# Patient Record
Sex: MALE | Race: BLACK OR AFRICAN AMERICAN | NOT HISPANIC OR LATINO | URBAN - METROPOLITAN AREA
[De-identification: names, ages, dates, MRNs, and addresses within clinical notes are randomized per-mention and may not be internally consistent; named-entity substitution may affect disease eponyms.]

---

## 2019-09-07 ENCOUNTER — EMERGENCY (EMERGENCY)
Facility: HOSPITAL | Age: 49
LOS: 1 days | Discharge: HOME | End: 2019-09-07
Attending: EMERGENCY MEDICINE | Admitting: EMERGENCY MEDICINE
Payer: COMMERCIAL

## 2019-09-07 VITALS
OXYGEN SATURATION: 100 % | SYSTOLIC BLOOD PRESSURE: 110 MMHG | HEART RATE: 86 BPM | DIASTOLIC BLOOD PRESSURE: 81 MMHG | RESPIRATION RATE: 100 BRPM | TEMPERATURE: 98 F

## 2019-09-07 DIAGNOSIS — Z98.89 OTHER SPECIFIED POSTPROCEDURAL STATES: Chronic | ICD-10-CM

## 2019-09-07 LAB
ALBUMIN SERPL ELPH-MCNC: 4.1 G/DL — SIGNIFICANT CHANGE UP (ref 3.3–5)
ALP SERPL-CCNC: 71 U/L — SIGNIFICANT CHANGE UP (ref 40–120)
ALT FLD-CCNC: 31 U/L — SIGNIFICANT CHANGE UP (ref 4–41)
ANION GAP SERPL CALC-SCNC: 9 MMO/L — SIGNIFICANT CHANGE UP (ref 7–14)
AST SERPL-CCNC: 24 U/L — SIGNIFICANT CHANGE UP (ref 4–40)
BASOPHILS # BLD AUTO: 0.04 K/UL — SIGNIFICANT CHANGE UP (ref 0–0.2)
BASOPHILS NFR BLD AUTO: 0.6 % — SIGNIFICANT CHANGE UP (ref 0–2)
BILIRUB SERPL-MCNC: < 0.2 MG/DL — LOW (ref 0.2–1.2)
BUN SERPL-MCNC: 16 MG/DL — SIGNIFICANT CHANGE UP (ref 7–23)
CALCIUM SERPL-MCNC: 9.2 MG/DL — SIGNIFICANT CHANGE UP (ref 8.4–10.5)
CHLORIDE SERPL-SCNC: 102 MMOL/L — SIGNIFICANT CHANGE UP (ref 98–107)
CO2 SERPL-SCNC: 29 MMOL/L — SIGNIFICANT CHANGE UP (ref 22–31)
CREAT SERPL-MCNC: 0.88 MG/DL — SIGNIFICANT CHANGE UP (ref 0.5–1.3)
EOSINOPHIL # BLD AUTO: 0.3 K/UL — SIGNIFICANT CHANGE UP (ref 0–0.5)
EOSINOPHIL NFR BLD AUTO: 4.5 % — SIGNIFICANT CHANGE UP (ref 0–6)
GLUCOSE SERPL-MCNC: 126 MG/DL — HIGH (ref 70–99)
HCT VFR BLD CALC: 43.7 % — SIGNIFICANT CHANGE UP (ref 39–50)
HGB BLD-MCNC: 13.7 G/DL — SIGNIFICANT CHANGE UP (ref 13–17)
IMM GRANULOCYTES NFR BLD AUTO: 0.3 % — SIGNIFICANT CHANGE UP (ref 0–1.5)
LYMPHOCYTES # BLD AUTO: 2.22 K/UL — SIGNIFICANT CHANGE UP (ref 1–3.3)
LYMPHOCYTES # BLD AUTO: 33.5 % — SIGNIFICANT CHANGE UP (ref 13–44)
MCHC RBC-ENTMCNC: 25.5 PG — LOW (ref 27–34)
MCHC RBC-ENTMCNC: 31.4 % — LOW (ref 32–36)
MCV RBC AUTO: 81.2 FL — SIGNIFICANT CHANGE UP (ref 80–100)
MONOCYTES # BLD AUTO: 0.55 K/UL — SIGNIFICANT CHANGE UP (ref 0–0.9)
MONOCYTES NFR BLD AUTO: 8.3 % — SIGNIFICANT CHANGE UP (ref 2–14)
NEUTROPHILS # BLD AUTO: 3.49 K/UL — SIGNIFICANT CHANGE UP (ref 1.8–7.4)
NEUTROPHILS NFR BLD AUTO: 52.8 % — SIGNIFICANT CHANGE UP (ref 43–77)
NRBC # FLD: 0 K/UL — SIGNIFICANT CHANGE UP (ref 0–0)
OB PNL STL: POSITIVE — SIGNIFICANT CHANGE UP
PLATELET # BLD AUTO: 274 K/UL — SIGNIFICANT CHANGE UP (ref 150–400)
PMV BLD: 9.5 FL — SIGNIFICANT CHANGE UP (ref 7–13)
POTASSIUM SERPL-MCNC: 3.8 MMOL/L — SIGNIFICANT CHANGE UP (ref 3.5–5.3)
POTASSIUM SERPL-SCNC: 3.8 MMOL/L — SIGNIFICANT CHANGE UP (ref 3.5–5.3)
PROT SERPL-MCNC: 7.2 G/DL — SIGNIFICANT CHANGE UP (ref 6–8.3)
RBC # BLD: 5.38 M/UL — SIGNIFICANT CHANGE UP (ref 4.2–5.8)
RBC # FLD: 12.5 % — SIGNIFICANT CHANGE UP (ref 10.3–14.5)
SODIUM SERPL-SCNC: 140 MMOL/L — SIGNIFICANT CHANGE UP (ref 135–145)
WBC # BLD: 6.62 K/UL — SIGNIFICANT CHANGE UP (ref 3.8–10.5)
WBC # FLD AUTO: 6.62 K/UL — SIGNIFICANT CHANGE UP (ref 3.8–10.5)

## 2019-09-07 PROCEDURE — 99283 EMERGENCY DEPT VISIT LOW MDM: CPT

## 2019-09-07 RX ORDER — ACETAMINOPHEN 500 MG
975 TABLET ORAL ONCE
Refills: 0 | Status: COMPLETED | OUTPATIENT
Start: 2019-09-07 | End: 2019-09-07

## 2019-09-07 RX ADMIN — Medication 975 MILLIGRAM(S): at 18:44

## 2019-09-07 NOTE — ED PROVIDER NOTE - CLINICAL SUMMARY MEDICAL DECISION MAKING FREE TEXT BOX
50 yo M, w/ PMH of chronic rectal pain and bleeding with negative colonoscopies in the past, presenting w/ acute exacerbation of his chronic rectal pain, which began last night, and has gradually worsened. Rectal exam causes pain, but otherwise unremarkable rectal physical exam. Remainder of physical exam unremarkable. Will obtain fecal occult blood feces and reassess. 50 yo M, w/ PMH of chronic rectal pain and bleeding with negative colonoscopies in the past, presenting w/ acute exacerbation of his chronic rectal pain, which began last night, and has gradually worsened. Rectal exam causes pain, but otherwise unremarkable rectal physical exam. Remainder of physical exam unremarkable. Will obtain fecal occult blood feces, labs, treat pain, and reassess.

## 2019-09-07 NOTE — ED PROVIDER NOTE - CONSTITUTIONAL, MLM
normal... Well appearing, well nourished, awake, alert, oriented to person, place, time/situation and in no apparent distress. Lying on side d/t rectal pain.

## 2019-09-07 NOTE — ED ADULT NURSE NOTE - NSIMPLEMENTINTERV_GEN_ALL_ED
Implemented All Universal Safety Interventions:  [place] to call system. Call bell, personal items and telephone within reach. Instruct patient to call for assistance. Room bathroom lighting operational. Non-slip footwear when patient is off stretcher. Physically safe environment: no spills, clutter or unnecessary equipment. Stretcher in lowest position, wheels locked, appropriate side rails in place.

## 2019-09-07 NOTE — ED PROVIDER NOTE - NSFOLLOWUPINSTRUCTIONS_ED_ALL_ED_FT
Follow up with your primary care physician in 48-72 hours- bring copies of your results.   Follow up with Gastroenterology- referral list provided.  Return to the Emergency Department for worsening or persistent symptoms OR ANY NEW OR CONCERNING SYMPTOMS.

## 2019-09-07 NOTE — ED ADULT TRIAGE NOTE - CHIEF COMPLAINT QUOTE
reports bleeding only when wiping, not actively bleeding Color consistent with ethnicity/race, warm, dry intact, resilient.

## 2019-09-07 NOTE — ED ADULT NURSE NOTE - OBJECTIVE STATEMENT
50 yo male, c/o rectal pain since childhood. pt has followed up with outside GI with no specific diagnosis. pt recently had colonoscopy, which results are not available yet. pt reports coming to ED for increased pain over last 2 days. pt denies active bleeding, and reports only bleeding while wiping. pt denies fever, n/v/d. pt reports intermittent heart burn. 20g iv insert to right ac. pt medicated as ordered.

## 2019-09-07 NOTE — ED PROVIDER NOTE - PATIENT PORTAL LINK FT
You can access the FollowMyHealth Patient Portal offered by [facility] by registering at the following website: [email or web address]. By joining [employer]’s FollowMyHealth portal, you will also be able to view your health information using other applications (apps) compatible with our system.

## 2019-09-07 NOTE — ED PROVIDER NOTE - PROGRESS NOTE DETAILS
STEPHANE Maldonado: Pt signed out to me by Dr. Morfin to follow up lab results and likely dc home w/ GI Followup. labs wnl. h/h stable, no elevated wbc. will dc home with outpatient GI and PMD follow up. return precautions given. ready for dc.

## 2019-09-07 NOTE — ED PROVIDER NOTE - PHYSICAL EXAMINATION
Rectal Exam (chaperoned by technician Skyla): No hemorrhoids, abscesses, pilonidal cysts, or anal fissures appreciated. No redness, swelling or induration in the anal area. +pain with rectal exam. No masses appreciated. Mucus-appearing stool on glove. Sent to lab for fecal occult blood. Rectal Exam (chaperoned by technician Skyla): No hemorrhoids, abscesses, pilonidal cysts, or anal fissures appreciated. No redness, swelling or induration in the anal area. +pain with rectal exam. No masses appreciated. Mucus-appearing stool on glove. Sent to lab for fecal occult blood.  ATTENDING PHYSICAL EXAM DR. Reid ***GEN - NAD; well appearing; A+O x3 ***HEAD - NC/AT   ***PULMONARY - CTA b/l, symmetric breath sounds. ***CARDIAC -s1s2, RRR, no M,R,G  ***ABDOMEN - +BS, ND, NT, soft, no guarding, no rebound, no masses   ***BACK - no CVA tenderness, Normal  spine ***EXTREMITIES - symmetric pulses, 2+ dp, capillary refill < 2 seconds, no clubbing, no cyanosis, no edema ***SKIN - no rash or bruising   Rectal exam as performed by Resident.

## 2019-09-07 NOTE — ED PROVIDER NOTE - OBJECTIVE STATEMENT
48 yo M, accompanied by family, w/ PMH of acute on chronic rectal pain/bleeding. Patient states that he has had rectal pain for a long time, with occasional bleeding as well. Has had multiple colonoscopies in the past, which have never been significant. He has no history of rectal abscesses or pilonidal cysts. He also has constipation, and has recently started taking stool softeners to help with this. States that his chronic rectal pain got a lot worse last night to the point where he could not sit on his butt d/t pain, which is why he presented to [facility] ED today. Denies abdominal pain, fevers, or any other complaints. Denies headache, neck stiffness, fever/chills, vision change, chest pain, shortness of breath, difficulty breathing, palpitations, lightheadedness, weakness, dizziness, numbness, tingling, abdominal pain, nausea, vomiting, diarrhea, dysuria, hematuria, syncope.     PMD: Dr. Joe Kolb  Pharm: [place], [place] 48 yo M, accompanied by family, w/ PMH of acute on chronic rectal pain/bleeding. Patient states that he has had rectal pain for a long time, with occasional bleeding as well. Has had multiple colonoscopies in the past, which have never been significant. He has no history of rectal abscesses or pilonidal cysts. He also has constipation, and has recently started taking stool softeners to help with this. States that his chronic rectal pain got a lot worse last night to the point where he could not sit on his butt d/t pain, which is why he presented to [facility] ED today. Denies abdominal pain, fevers, or any other complaints. Denies headache, neck stiffness, fever/chills, vision change, chest pain, shortness of breath, difficulty breathing, palpitations, lightheadedness, weakness, dizziness, numbness, tingling, abdominal pain, nausea, vomiting, diarrhea, dysuria, hematuria, syncope.     PMD: Dr. Joe Kolb  Pharm: [place], [place]  Attending - Reviewed above.  I evaluated patient myself.  48 y/o M with family at bedside.  Reports episodes of rectal pain > 1 year.  Reports cared for by a GI doctor in [place], unknown name.  Had colonoscopy done for evaluation approx 1 year ago.  Has returned to doctor but has not gotten results per patient.  Believes it may have shown "bruise" in rectum.  Has been advised to use creams and stool softeners.  To ED as frustrated that GI doctor has not made diagnosis and requesting new GI referral.  No pain currently.  States he often notices blood on toilet paper with wiping.  Denies abd pain/n/v.  No diarrhea.

## 2022-05-28 ENCOUNTER — EMERGENCY (EMERGENCY)
Facility: HOSPITAL | Age: 52
LOS: 1 days | Discharge: HOME | End: 2022-05-28
Admitting: EMERGENCY MEDICINE
Payer: MEDICAID

## 2022-05-28 VITALS
SYSTOLIC BLOOD PRESSURE: 128 MMHG | TEMPERATURE: 98 F | DIASTOLIC BLOOD PRESSURE: 86 MMHG | HEART RATE: 93 BPM | RESPIRATION RATE: 17 BRPM | OXYGEN SATURATION: 95 %

## 2022-05-28 DIAGNOSIS — Z98.89 OTHER SPECIFIED POSTPROCEDURAL STATES: Chronic | ICD-10-CM

## 2022-05-28 PROCEDURE — 73110 X-RAY EXAM OF WRIST: CPT | Mod: 26,RT

## 2022-05-28 PROCEDURE — 99284 EMERGENCY DEPT VISIT MOD MDM: CPT

## 2022-05-28 PROCEDURE — 73090 X-RAY EXAM OF FOREARM: CPT | Mod: 26,RT
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
## 2022-05-28 NOTE — ED PROVIDER NOTE - PATIENT PORTAL LINK FT
You can access the FollowMyHealth Patient Portal offered by [facility] by registering at the following website: [email or web address]. By joining [employer]’s FollowMyHealth portal, you will also be able to view your health information using other applications (apps) compatible with our system.
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
## 2022-05-28 NOTE — ED PROVIDER NOTE - MUSCULOSKELETAL, MLM
right forearm: + bruising and swelling to the lateral aspect of distal 3rd of forearm. FROM of wrist,elbow and shoulder joint. no sensory deficits, compartment is soft. pulse present.

## 2022-12-15 ENCOUNTER — INPATIENT (INPATIENT)
Facility: HOSPITAL | Age: 52
LOS: 1 days | Discharge: HOME | End: 2022-12-17
Attending: INTERNAL MEDICINE | Admitting: INTERNAL MEDICINE

## 2022-12-15 VITALS
OXYGEN SATURATION: 100 % | DIASTOLIC BLOOD PRESSURE: 87 MMHG | TEMPERATURE: 98 F | RESPIRATION RATE: 14 BRPM | HEART RATE: 101 BPM | SYSTOLIC BLOOD PRESSURE: 126 MMHG

## 2022-12-15 DIAGNOSIS — R07.9 CHEST PAIN, UNSPECIFIED: ICD-10-CM

## 2022-12-15 DIAGNOSIS — S09.90XA UNSPECIFIED INJURY OF HEAD, INITIAL ENCOUNTER: ICD-10-CM

## 2022-12-15 DIAGNOSIS — Z29.9 ENCOUNTER FOR PROPHYLACTIC MEASURES, UNSPECIFIED: ICD-10-CM

## 2022-12-15 DIAGNOSIS — R06.02 SHORTNESS OF BREATH: ICD-10-CM

## 2022-12-15 DIAGNOSIS — J10.1 INFLUENZA DUE TO OTHER IDENTIFIED INFLUENZA VIRUS WITH OTHER RESPIRATORY MANIFESTATIONS: ICD-10-CM

## 2022-12-15 DIAGNOSIS — Z98.89 OTHER SPECIFIED POSTPROCEDURAL STATES: Chronic | ICD-10-CM

## 2022-12-15 LAB
ALBUMIN SERPL ELPH-MCNC: 4.6 G/DL — SIGNIFICANT CHANGE UP (ref 3.3–5)
ALP SERPL-CCNC: 81 U/L — SIGNIFICANT CHANGE UP (ref 40–120)
ALT FLD-CCNC: 25 U/L — SIGNIFICANT CHANGE UP (ref 4–41)
ANION GAP SERPL CALC-SCNC: 11 MMOL/L — SIGNIFICANT CHANGE UP (ref 7–14)
AST SERPL-CCNC: 23 U/L — SIGNIFICANT CHANGE UP (ref 4–40)
BASOPHILS # BLD AUTO: 0.03 K/UL — SIGNIFICANT CHANGE UP (ref 0–0.2)
BASOPHILS NFR BLD AUTO: 0.4 % — SIGNIFICANT CHANGE UP (ref 0–2)
BILIRUB SERPL-MCNC: 0.2 MG/DL — SIGNIFICANT CHANGE UP (ref 0.2–1.2)
BUN SERPL-MCNC: 19 MG/DL — SIGNIFICANT CHANGE UP (ref 7–23)
CALCIUM SERPL-MCNC: 9.1 MG/DL — SIGNIFICANT CHANGE UP (ref 8.4–10.5)
CHLORIDE SERPL-SCNC: 100 MMOL/L — SIGNIFICANT CHANGE UP (ref 98–107)
CO2 SERPL-SCNC: 27 MMOL/L — SIGNIFICANT CHANGE UP (ref 22–31)
CREAT SERPL-MCNC: 0.94 MG/DL — SIGNIFICANT CHANGE UP (ref 0.5–1.3)
EGFR: 96 ML/MIN/1.73M2 — SIGNIFICANT CHANGE UP
EOSINOPHIL # BLD AUTO: 0.13 K/UL — SIGNIFICANT CHANGE UP (ref 0–0.5)
EOSINOPHIL NFR BLD AUTO: 1.6 % — SIGNIFICANT CHANGE UP (ref 0–6)
FLUAV AG NPH QL: DETECTED
FLUBV AG NPH QL: SIGNIFICANT CHANGE UP
GLUCOSE SERPL-MCNC: 99 MG/DL — SIGNIFICANT CHANGE UP (ref 70–99)
HCT VFR BLD CALC: 41.9 % — SIGNIFICANT CHANGE UP (ref 39–50)
HGB BLD-MCNC: 13.2 G/DL — SIGNIFICANT CHANGE UP (ref 13–17)
IANC: 6.28 K/UL — SIGNIFICANT CHANGE UP (ref 1.8–7.4)
IMM GRANULOCYTES NFR BLD AUTO: 0.4 % — SIGNIFICANT CHANGE UP (ref 0–0.9)
LYMPHOCYTES # BLD AUTO: 1.06 K/UL — SIGNIFICANT CHANGE UP (ref 1–3.3)
LYMPHOCYTES # BLD AUTO: 12.9 % — LOW (ref 13–44)
MCHC RBC-ENTMCNC: 25 PG — LOW (ref 27–34)
MCHC RBC-ENTMCNC: 31.5 GM/DL — LOW (ref 32–36)
MCV RBC AUTO: 79.4 FL — LOW (ref 80–100)
MONOCYTES # BLD AUTO: 0.66 K/UL — SIGNIFICANT CHANGE UP (ref 0–0.9)
MONOCYTES NFR BLD AUTO: 8.1 % — SIGNIFICANT CHANGE UP (ref 2–14)
NEUTROPHILS # BLD AUTO: 6.28 K/UL — SIGNIFICANT CHANGE UP (ref 1.8–7.4)
NEUTROPHILS NFR BLD AUTO: 76.6 % — SIGNIFICANT CHANGE UP (ref 43–77)
NRBC # BLD: 0 /100 WBCS — SIGNIFICANT CHANGE UP (ref 0–0)
NRBC # FLD: 0 K/UL — SIGNIFICANT CHANGE UP (ref 0–0)
NT-PROBNP SERPL-SCNC: <5 PG/ML — SIGNIFICANT CHANGE UP
PLATELET # BLD AUTO: 291 K/UL — SIGNIFICANT CHANGE UP (ref 150–400)
POTASSIUM SERPL-MCNC: 4.4 MMOL/L — SIGNIFICANT CHANGE UP (ref 3.5–5.3)
POTASSIUM SERPL-SCNC: 4.4 MMOL/L — SIGNIFICANT CHANGE UP (ref 3.5–5.3)
PROT SERPL-MCNC: 7.4 G/DL — SIGNIFICANT CHANGE UP (ref 6–8.3)
RBC # BLD: 5.28 M/UL — SIGNIFICANT CHANGE UP (ref 4.2–5.8)
RBC # FLD: 12.7 % — SIGNIFICANT CHANGE UP (ref 10.3–14.5)
RSV RNA NPH QL NAA+NON-PROBE: SIGNIFICANT CHANGE UP
SARS-COV-2 RNA SPEC QL NAA+PROBE: SIGNIFICANT CHANGE UP
SODIUM SERPL-SCNC: 138 MMOL/L — SIGNIFICANT CHANGE UP (ref 135–145)
TROPONIN T, HIGH SENSITIVITY RESULT: 6 NG/L — SIGNIFICANT CHANGE UP
WBC # BLD: 8.19 K/UL — SIGNIFICANT CHANGE UP (ref 3.8–10.5)
WBC # FLD AUTO: 8.19 K/UL — SIGNIFICANT CHANGE UP (ref 3.8–10.5)

## 2022-12-15 PROCEDURE — 99223 1ST HOSP IP/OBS HIGH 75: CPT

## 2022-12-15 PROCEDURE — 99285 EMERGENCY DEPT VISIT HI MDM: CPT

## 2022-12-15 PROCEDURE — 71046 X-RAY EXAM CHEST 2 VIEWS: CPT | Mod: 26

## 2022-12-15 PROCEDURE — 93010 ELECTROCARDIOGRAM REPORT: CPT

## 2022-12-15 PROCEDURE — 70450 CT HEAD/BRAIN W/O DYE: CPT | Mod: 26,MA

## 2022-12-15 RX ORDER — SODIUM CHLORIDE 9 MG/ML
1000 INJECTION INTRAMUSCULAR; INTRAVENOUS; SUBCUTANEOUS ONCE
Refills: 0 | Status: COMPLETED | OUTPATIENT
Start: 2022-12-15 | End: 2022-12-15

## 2022-12-15 RX ORDER — ASPIRIN/CALCIUM CARB/MAGNESIUM 324 MG
81 TABLET ORAL DAILY
Refills: 0 | Status: DISCONTINUED | OUTPATIENT
Start: 2022-12-15 | End: 2022-12-17

## 2022-12-15 RX ORDER — LANOLIN ALCOHOL/MO/W.PET/CERES
3 CREAM (GRAM) TOPICAL AT BEDTIME
Refills: 0 | Status: DISCONTINUED | OUTPATIENT
Start: 2022-12-15 | End: 2022-12-17

## 2022-12-15 RX ORDER — ACETAMINOPHEN 500 MG
650 TABLET ORAL EVERY 6 HOURS
Refills: 0 | Status: DISCONTINUED | OUTPATIENT
Start: 2022-12-15 | End: 2022-12-17

## 2022-12-15 RX ORDER — ENOXAPARIN SODIUM 100 MG/ML
40 INJECTION SUBCUTANEOUS EVERY 24 HOURS
Refills: 0 | Status: DISCONTINUED | OUTPATIENT
Start: 2022-12-15 | End: 2022-12-17

## 2022-12-15 RX ADMIN — Medication 650 MILLIGRAM(S): at 23:17

## 2022-12-15 RX ADMIN — SODIUM CHLORIDE 1000 MILLILITER(S): 9 INJECTION INTRAMUSCULAR; INTRAVENOUS; SUBCUTANEOUS at 20:46
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
## 2022-12-15 NOTE — H&P ADULT - NSHPREVIEWOFSYSTEMS_GEN_ALL_CORE
ROS:    Constitutional: [ ] fevers [ ] chills  HEENT: [ ] postnasal drip [ ] nasal congestion  CV: [x ] chest pain [ ] orthopnea [ ] palpitations   Resp: [ ] cough [x ] shortness of breath [ ] dyspnea [ ] wheezing   GI: [ ] nausea [ ] vomiting [ ] diarrhea [ ] constipation [ ] abd pain    : [ ] dysuria  [ ] increased urinary frequency  Musculoskeletal: [ ] back pain [ ] myalgias [ ] arthralgias   Skin: [ ] rash [ ] itch  Neurological: [ ] headache [ ] dizziness [ ] syncope   Endocrine: [ ] diabetes [ ] thyroid problem  Hematologic/Lymphatic: [ ] anemia [ ] bleeding problem  [x ] All other systems negative

## 2022-12-15 NOTE — H&P ADULT - PROBLEM SELECTOR PLAN 2
Chronic SOB for years. Has at night at times. No hypoxia, no tachypnea  -sleep study outpatient  -pulm f/u  -echo, stress

## 2022-12-15 NOTE — H&P ADULT - PROBLEM SELECTOR PLAN 4
Lovenox 40mg qd  DASH/TLC diet Has HA but no other URI sx  -tylenol PRN, cough suppressants if needed

## 2022-12-15 NOTE — ED ADULT NURSE NOTE - OBJECTIVE STATEMENT
Pt received c/o chets discomfort, sinus congestion and cough. Pt endorses fevers at home. Afebrile at present. Denies SOB, respirations even and unlabored. 20g PIV placed in R AC, labs drawn as ordered. Safety maintained. Awaiting further orders.

## 2022-12-15 NOTE — ED PROVIDER NOTE - PHYSICAL EXAMINATION
Lisa BROWN:  VITALS: Initial triage and subsequent vitals have been reviewed by me.  GEN APPEARANCE: WDWN, alert and cooperative, non-toxic appearing and in NAD  HEAD: Atraumatic, normocephalic   EYES: PERRLa, EOMI, vision grossly intact.   EARS: Gross hearing intact.   NOSE: No nasal discharge, no external evidence of epistaxis.   NECK: Supple  CV: RRR, S1S2, no c/r/m/g. No cyanosis. Extremities warm, well perfused. Cap refill <2 seconds. No bruits.   LUNGS: CTAB. No wheezing. No rales. No rhonchi. No diminished breath sounds.   ABDOMEN: Soft, NTND. No guarding or rebound. No masses.   MSK/EXT: Spine appears normal, no spine point tenderness. No CVA ttp. Normal muscular development. Pelvis stable. No obvious joint or bony deformity, no peripheral edema.   NEURO: Alert, follows commands. Weight bearing normal. Speech normal. Sensation and motor normal x4 extremities.   SKIN: Normal color for race, warm, dry and intact. No evidence of rash.  PSYCH: Normal mood and affect.

## 2022-12-15 NOTE — H&P ADULT - NSHPLABSRESULTS_GEN_ALL_CORE
I have personally reviewed this patient's labs below:                        13.2   8.19  )-----------( 291      ( 15 Dec 2022 20:34 )             41.9     12-15-22 @ 20:34    138  |  100  |  19             --------------------------< 99     4.4  |  27  | 0.94    eGFR AA: --  eGFR N-AA: --    Calcium: 9.1  Phosphorus: --  Magnesium: --    AST: 23    ALT: 25  AlkPhos: 81  Protein: 7.4  Albumin: 4.6  TBili: 0.2  D-Bili: --        I have personally reviewed this patient's EKG and my independent interpretation is NSR, left axis deviation, Q wave V2    I have personally reviewed this patient's CXR and my independent interpretation is clear lungs      CTH performed and read pending

## 2022-12-15 NOTE — H&P ADULT - PROBLEM SELECTOR PLAN 1
Chest pain x1 week. Believes he had prior MI? but no records of. Denies prior angiogram.   -tele  -trop neg  -EKG nonischemic  -repeat trop, CK  -D-dimer and if elevated can get CTA. Though wells score 0, low risk overall  -echo ordered  -ischemic eval per primary cardiology team in AM. Likely will need at least stress test  -lipids, TSH
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
## 2022-12-15 NOTE — H&P ADULT - HISTORY OF PRESENT ILLNESS
52M with PMHx CAD? presenting with CP x1 week. Pt endorses having an abnormal EKG several years ago and being sent to ED by his PMD. He believes he was told he had an MI but is unsure. He does not recall ever having an angiogram or stents placed but remembers a stress test that was normal several years ago. He endorses chronic CP that comes and goes. However this week it has been more frequent and sometimes associated with exertion and relieved with rest. Also has some SOB on and off for years. This is worse at night and is yet to get sleep study. Denies LE edema, calf pain, hx stroke, arrhythmia, CHF. His only medication is ASA. Denies fever, cough, abdominal pain, vomiting, diarrhea.    In ED flu positive
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
## 2022-12-15 NOTE — H&P ADULT - NSHPPHYSICALEXAM_GEN_ALL_CORE
PHYSICAL EXAM:  Vital Signs Last 24 Hrs  T(C): 37.6 (12-15-22 @ 22:30)  T(F): 99.7 (12-15-22 @ 22:30), Max: 99.7 (12-15-22 @ 22:30)  HR: 90 (12-15-22 @ 22:30) (90 - 101)  BP: 142/94 (12-15-22 @ 22:30)  BP(mean): 114 (12-15-22 @ 22:30) (114 - 114)  RR: 17 (12-15-22 @ 22:30) (14 - 17)  SpO2: 100% (12-15-22 @ 22:30) (100% - 100%)  Wt(kg): --    Constitutional: NAD, awake and alert, well developed  EYES: EOMI, conjunctiva clear  ENT:  Normal Hearing, no tonsillar exudates   Neck: Soft and supple , no thyromegaly   Respiratory: Breath sounds are clear bilaterally, No wheezing, rales or rhonchi, no tachypnea, no accessory muscle use  Cardiovascular: S1 and S2, regular rate and rhythm, no Murmurs, gallops or rubs, no JVD, no leg edema  Gastrointestinal: Bowel Sounds present, soft, nontender, nondistended, no guarding, no rebound  Extremities: No cyanosis or clubbing; warm to touch  Vascular: 2+ peripheral pulses lower ex  Neurological: No focal deficits, CN II-XII intact bilaterally, sensation to light touch intact in all extremities  Musculoskeletal: 5/5 strength b/l upper and lower extremities; no joint swelling.

## 2022-12-15 NOTE — ED ADULT TRIAGE NOTE - CHIEF COMPLAINT QUOTE
pt c/o intermittent chest pain x 1 week. pain 4/10 at this time, with pain in head and ears at this time, denies dizziness, n/v.

## 2022-12-16 LAB
ANION GAP SERPL CALC-SCNC: 10 MMOL/L — SIGNIFICANT CHANGE UP (ref 7–14)
BUN SERPL-MCNC: 14 MG/DL — SIGNIFICANT CHANGE UP (ref 7–23)
CALCIUM SERPL-MCNC: 8.8 MG/DL — SIGNIFICANT CHANGE UP (ref 8.4–10.5)
CHLORIDE SERPL-SCNC: 100 MMOL/L — SIGNIFICANT CHANGE UP (ref 98–107)
CHOLEST SERPL-MCNC: 146 MG/DL — SIGNIFICANT CHANGE UP
CK MB BLD-MCNC: 1.6 % — SIGNIFICANT CHANGE UP (ref 0–2.5)
CK MB CFR SERPL CALC: 1.5 NG/ML — SIGNIFICANT CHANGE UP
CK SERPL-CCNC: 92 U/L — SIGNIFICANT CHANGE UP (ref 30–200)
CO2 SERPL-SCNC: 26 MMOL/L — SIGNIFICANT CHANGE UP (ref 22–31)
CREAT SERPL-MCNC: 1.01 MG/DL — SIGNIFICANT CHANGE UP (ref 0.5–1.3)
D DIMER BLD IA.RAPID-MCNC: 153 NG/ML DDU — SIGNIFICANT CHANGE UP
EGFR: 88 ML/MIN/1.73M2 — SIGNIFICANT CHANGE UP
GLUCOSE SERPL-MCNC: 107 MG/DL — HIGH (ref 70–99)
HCT VFR BLD CALC: 43.3 % — SIGNIFICANT CHANGE UP (ref 39–50)
HDLC SERPL-MCNC: 41 MG/DL — SIGNIFICANT CHANGE UP
HGB BLD-MCNC: 13.8 G/DL — SIGNIFICANT CHANGE UP (ref 13–17)
LIPID PNL WITH DIRECT LDL SERPL: 95 MG/DL — SIGNIFICANT CHANGE UP
MAGNESIUM SERPL-MCNC: 2 MG/DL — SIGNIFICANT CHANGE UP (ref 1.6–2.6)
MCHC RBC-ENTMCNC: 25.4 PG — LOW (ref 27–34)
MCHC RBC-ENTMCNC: 31.9 GM/DL — LOW (ref 32–36)
MCV RBC AUTO: 79.6 FL — LOW (ref 80–100)
NON HDL CHOLESTEROL: 105 MG/DL — SIGNIFICANT CHANGE UP
NRBC # BLD: 0 /100 WBCS — SIGNIFICANT CHANGE UP (ref 0–0)
NRBC # FLD: 0 K/UL — SIGNIFICANT CHANGE UP (ref 0–0)
PHOSPHATE SERPL-MCNC: 3.6 MG/DL — SIGNIFICANT CHANGE UP (ref 2.5–4.5)
PLATELET # BLD AUTO: 280 K/UL — SIGNIFICANT CHANGE UP (ref 150–400)
POTASSIUM SERPL-MCNC: 3.8 MMOL/L — SIGNIFICANT CHANGE UP (ref 3.5–5.3)
POTASSIUM SERPL-SCNC: 3.8 MMOL/L — SIGNIFICANT CHANGE UP (ref 3.5–5.3)
RBC # BLD: 5.44 M/UL — SIGNIFICANT CHANGE UP (ref 4.2–5.8)
RBC # FLD: 12.8 % — SIGNIFICANT CHANGE UP (ref 10.3–14.5)
SODIUM SERPL-SCNC: 136 MMOL/L — SIGNIFICANT CHANGE UP (ref 135–145)
TRIGL SERPL-MCNC: 52 MG/DL — SIGNIFICANT CHANGE UP
TROPONIN T, HIGH SENSITIVITY RESULT: <6 NG/L — SIGNIFICANT CHANGE UP
TSH SERPL-MCNC: 1.73 UIU/ML — SIGNIFICANT CHANGE UP (ref 0.27–4.2)
WBC # BLD: 6.63 K/UL — SIGNIFICANT CHANGE UP (ref 3.8–10.5)
WBC # FLD AUTO: 6.63 K/UL — SIGNIFICANT CHANGE UP (ref 3.8–10.5)

## 2022-12-16 PROCEDURE — 93306 TTE W/DOPPLER COMPLETE: CPT | Mod: 26

## 2022-12-16 RX ORDER — IBUPROFEN 200 MG
400 TABLET ORAL ONCE
Refills: 0 | Status: COMPLETED | OUTPATIENT
Start: 2022-12-16 | End: 2022-12-16

## 2022-12-16 RX ADMIN — Medication 75 MILLIGRAM(S): at 18:40

## 2022-12-16 RX ADMIN — Medication 81 MILLIGRAM(S): at 11:48

## 2022-12-16 RX ADMIN — Medication 650 MILLIGRAM(S): at 14:00

## 2022-12-16 RX ADMIN — Medication 400 MILLIGRAM(S): at 14:32

## 2022-12-16 RX ADMIN — Medication 650 MILLIGRAM(S): at 18:40

## 2022-12-16 RX ADMIN — Medication 400 MILLIGRAM(S): at 16:09

## 2022-12-16 RX ADMIN — Medication 650 MILLIGRAM(S): at 11:47

## 2022-12-16 RX ADMIN — Medication 200 MILLIGRAM(S): at 11:47

## 2022-12-16 RX ADMIN — Medication 200 MILLIGRAM(S): at 18:41

## 2022-12-16 RX ADMIN — Medication 650 MILLIGRAM(S): at 19:25

## 2022-12-16 RX ADMIN — ENOXAPARIN SODIUM 40 MILLIGRAM(S): 100 INJECTION SUBCUTANEOUS at 05:30

## 2022-12-16 NOTE — PROGRESS NOTE ADULT - PROBLEM SELECTOR PLAN 4
- fever, chills, cough   -tylenol PRN, cough suppressants if needed  - started on NSAIDs and tamiflu

## 2022-12-16 NOTE — CHART NOTE - NSCHARTNOTEFT_GEN_A_CORE
Patient admitted w/ chest pain, found +Influenza on admission, notified by RN patient febrile despite recent Tylenol administration.    Discussed w/ Dr. Moncada, plan as follows:  - Ibuprofen x1 now  - Start Tamiflu      Janet Matthews NP-BC  Department of Medicine  In House Pager #[phone]

## 2022-12-16 NOTE — CONSULT NOTE ADULT - SUBJECTIVE AND OBJECTIVE BOX
Cardiovascular Disease Initial Evaluation  Date of service: 12-16-22 @ 12:41    CHIEF COMPLAINT: Chest pain    HISTORY OF PRESENT ILLNESS:  52M with PMHx CAD presenting with CP x1 week. Pt endorses having an abnormal EKG several years ago and being sent to ED by his PMD. He believes he was told he had an MI. Pt states he underwent a stress test at that time which was apparently normal. Pt denies history of PCI. As per patient, work up completed in [place]. He endorses chronic CP that comes and goes. However this week it has been more frequent and sometimes associated with exertion and relieved with rest. Pt admits to generalized fatigue, headache and cough. Upon arrival, pt found to be positive for flu with low grade temperature.       Allergies    penicillins (Unknown)    Intolerances    	    MEDICATIONS:  aspirin enteric coated 81 milliGRAM(s) Oral daily  enoxaparin Injectable 40 milliGRAM(s) SubCutaneous every 24 hours      guaiFENesin Oral Liquid (Sugar-Free) 200 milliGRAM(s) Oral every 6 hours PRN    acetaminophen     Tablet .. 650 milliGRAM(s) Oral every 6 hours PRN  melatonin 3 milliGRAM(s) Oral at bedtime PRN            PAST MEDICAL & SURGICAL HISTORY:  Sinus infection      S/P ear surgery          FAMILY HISTORY:  No pertinent family history in first degree relatives        SOCIAL HISTORY:    The patient is a nonsmoker       REVIEW OF SYSTEMS:  See HPI, otherwise complete 14 point review of systems negative    [x ] All others negative	  [ ] Unable to obtain    PHYSICAL EXAM:  T(C): 37.6 (12-16-22 @ 10:07), Max: 37.6 (12-15-22 @ 22:30)  HR: 95 (12-16-22 @ 10:07) (89 - 101)  BP: 128/72 (12-16-22 @ 10:07) (111/69 - 142/94)  RR: 20 (12-16-22 @ 10:07) (14 - 20)  SpO2: 98% (12-16-22 @ 10:07) (98% - 100%)  Wt(kg): --  I&O's Summary      Appearance: No Acute Distress; resting comfortably  HEENT:  Normal oral mucosa, PERRL, EOMI	  Cardiovascular: Normal S1 S2, No JVD, No murmurs/rubs/gallops  Respiratory: Normal respiratory effort; Lungs clear to auscultation bilaterally  Gastrointestinal:  Soft, Non-tender, + BS	  Skin: No rashes, No ecchymoses, No cyanosis	  Neurologic: Non-focal; no weakness  Extremities: No clubbing, cyanosis or edema  Vascular: Peripheral pulses palpable 2+ bilaterally  Psychiatry: A & O x 3, Mood & affect appropriate    Laboratory Data:	 	    CBC Full  -  ( 16 Dec 2022 06:52 )  WBC Count : 6.63 K/uL  Hemoglobin : 13.8 g/dL  Hematocrit : 43.3 %  Platelet Count - Automated : 280 K/uL  Mean Cell Volume : 79.6 fL  Mean Cell Hemoglobin : 25.4 pg  Mean Cell Hemoglobin Concentration : 31.9 gm/dL  Auto Neutrophil # : x  Auto Lymphocyte # : x  Auto Monocyte # : x  Auto Eosinophil # : x  Auto Basophil # : x  Auto Neutrophil % : x  Auto Lymphocyte % : x  Auto Monocyte % : x  Auto Eosinophil % : x  Auto Basophil % : x    12-16    136  |  100  |  14  ----------------------------<  107<H>  3.8   |  26  |  1.01  12-15    138  |  100  |  19  ----------------------------<  99  4.4   |  27  |  0.94    Ca    8.8      16 Dec 2022 06:52  Ca    9.1      15 Dec 2022 20:34  Phos  3.6     12-16  Mg     2.00     12-16    TPro  7.4  /  Alb  4.6  /  TBili  0.2  /  DBili  x   /  AST  23  /  ALT  25  /  AlkPhos  81  12-15      proBNP: Serum Pro-Brain Natriuretic Peptide: <5 pg/mL (12-15 @ 20:34)    Lipid Profile:   HgA1c:   TSH: Thyroid Stimulating Hormone, Serum: 1.73 uIU/mL (12-15 @ 23:26)        CARDIAC MARKERS: Trop T 6- <6            Interpretation of Telemetry: sinus    ECG: Sinus rhythm  RADIOLOGY:  OTHER: 	    PREVIOUS DIAGNOSTIC TESTING:    [ ] Echocardiogram:  [ ] Catheterization:  [ ] Stress Test:  	    Assessment:  2M with PMHx CAD presenting with CP x1 week.    Plan of Care:    #Chest pain  - ACS ruled out  - EKG shows sinus rhythm with no ischemic changes  - Recommend TTE  - Stress testing can be done as outpatient once patient has recovered from viral infection  - Continue ASA    #Influenza  - Management as per primary team    #Obesity  - Lifestyle modifications    #ACP (advance care planning)-  Advanced care planning was discussed with the patient.  Risks, benefits and alternatives of medical treatment and procedures were discussed in detail and all questions were answered. 30 additional minutes spent addressing advance care plans.        72 minutes spent on total encounter; more than 50% of the visit was spent counseling and/or coordinating care by the attending physician.   	  Sudheer Kamara DO [facility]  Cardiovascular Diseases  [phone]

## 2022-12-17 VITALS — TEMPERATURE: 100 F

## 2022-12-17 LAB
ANION GAP SERPL CALC-SCNC: 11 MMOL/L — SIGNIFICANT CHANGE UP (ref 7–14)
BUN SERPL-MCNC: 14 MG/DL — SIGNIFICANT CHANGE UP (ref 7–23)
CALCIUM SERPL-MCNC: 9.1 MG/DL — SIGNIFICANT CHANGE UP (ref 8.4–10.5)
CHLORIDE SERPL-SCNC: 101 MMOL/L — SIGNIFICANT CHANGE UP (ref 98–107)
CO2 SERPL-SCNC: 26 MMOL/L — SIGNIFICANT CHANGE UP (ref 22–31)
CREAT SERPL-MCNC: 0.87 MG/DL — SIGNIFICANT CHANGE UP (ref 0.5–1.3)
EGFR: 103 ML/MIN/1.73M2 — SIGNIFICANT CHANGE UP
GLUCOSE SERPL-MCNC: 112 MG/DL — HIGH (ref 70–99)
HCT VFR BLD CALC: 44.4 % — SIGNIFICANT CHANGE UP (ref 39–50)
HGB BLD-MCNC: 14 G/DL — SIGNIFICANT CHANGE UP (ref 13–17)
MAGNESIUM SERPL-MCNC: 2 MG/DL — SIGNIFICANT CHANGE UP (ref 1.6–2.6)
MCHC RBC-ENTMCNC: 25.1 PG — LOW (ref 27–34)
MCHC RBC-ENTMCNC: 31.5 GM/DL — LOW (ref 32–36)
MCV RBC AUTO: 79.7 FL — LOW (ref 80–100)
NRBC # BLD: 0 /100 WBCS — SIGNIFICANT CHANGE UP (ref 0–0)
NRBC # FLD: 0 K/UL — SIGNIFICANT CHANGE UP (ref 0–0)
PHOSPHATE SERPL-MCNC: 3.6 MG/DL — SIGNIFICANT CHANGE UP (ref 2.5–4.5)
PLATELET # BLD AUTO: 263 K/UL — SIGNIFICANT CHANGE UP (ref 150–400)
POTASSIUM SERPL-MCNC: 3.9 MMOL/L — SIGNIFICANT CHANGE UP (ref 3.5–5.3)
POTASSIUM SERPL-SCNC: 3.9 MMOL/L — SIGNIFICANT CHANGE UP (ref 3.5–5.3)
RBC # BLD: 5.57 M/UL — SIGNIFICANT CHANGE UP (ref 4.2–5.8)
RBC # FLD: 13.1 % — SIGNIFICANT CHANGE UP (ref 10.3–14.5)
SODIUM SERPL-SCNC: 138 MMOL/L — SIGNIFICANT CHANGE UP (ref 135–145)
WBC # BLD: 6.72 K/UL — SIGNIFICANT CHANGE UP (ref 3.8–10.5)
WBC # FLD AUTO: 6.72 K/UL — SIGNIFICANT CHANGE UP (ref 3.8–10.5)

## 2022-12-17 RX ORDER — ACETAMINOPHEN 500 MG
2 TABLET ORAL
Qty: 0 | Refills: 0 | DISCHARGE
Start: 2022-12-17

## 2022-12-17 RX ADMIN — Medication 75 MILLIGRAM(S): at 06:12

## 2022-12-17 RX ADMIN — Medication 200 MILLIGRAM(S): at 01:57

## 2022-12-17 RX ADMIN — Medication 650 MILLIGRAM(S): at 01:57

## 2022-12-17 RX ADMIN — Medication 650 MILLIGRAM(S): at 02:33

## 2022-12-17 RX ADMIN — ENOXAPARIN SODIUM 40 MILLIGRAM(S): 100 INJECTION SUBCUTANEOUS at 06:12

## 2022-12-17 RX ADMIN — Medication 650 MILLIGRAM(S): at 09:24

## 2022-12-17 NOTE — DISCHARGE NOTE NURSING/CASE MANAGEMENT/SOCIAL WORK - NSDCFUADDAPPT_GEN_ALL_CORE_FT
Follow up with your primary care doctor within one week of discharge. If you do not have one, you can call the medicine clinic at [phone] to make an appointment.
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
## 2022-12-17 NOTE — DISCHARGE NOTE PROVIDER - NSDCCPCAREPLAN_GEN_ALL_CORE_FT
PRINCIPAL DISCHARGE DIAGNOSIS  Diagnosis: Chest pain  Assessment and Plan of Treatment: You were admitted to the hospital for chest pain.  You were followed by the cardiology team.  Your bloodwork and EKG showed no sign of heart attack.  An ultrasound of your heart (echocardiogram) showed normal pumping motion of your heart.  Your pain is likely caused by flu (viral illnessess).  Cardiology recommends outpatient follow up to obtain stress test after recovering from flu/viral illness.  Follow up with Cardiology within 1-2 weeks for further management and monitoring.      SECONDARY DISCHARGE DIAGNOSES  Diagnosis: Influenza  Assessment and Plan of Treatment: While in the hospital, you were found to have the flu.  You were started on Tamiflu, an antiviral, which is given to shorten duration of flu symptoms - complete as directed.  You make take Tylenol alternating with Motrin (600 mg by mouth) as directed for fever/pain.  Increase hydration of fluids (water, gatorade, broth, popsicles etc.).  Avoid alcohol while ill. Return to the ER should you develop high fevers greater than 103-104 or fevers unresponsive to tylenol/motrin, should you develop worsened shortness of breath or any respiratory distress including but not limited to inability to catch breath/inability to walk without marked shortness of breath or light-headedness, neck pain/stiffness, confusion, inability to tolerate oral intake or should you pass out. Please avoid any close or prolonged contact with other individuals, in particular: infants/elderly or individuals who are known to be immune supressed.  Follow up with your primary care doctor in 1-2 weeks for further management.    Diagnosis: Chronic shortness of breath  Assessment and Plan of Treatment: While in the hospital, you were evaluated for report of chronic shortness of breath.  Follow up outpatient with Pulmonology for full evaluation and sleep study.  Follow up with your primary care doctor in 1-2 weeks for further management and monitoring.    Diagnosis: Head trauma  Assessment and Plan of Treatment: While in the hospital, you were evaluted for reported head injury after recent fall.  A CAT Scan of your head was negative for any acute issues.  Follow up with your primary care doctor in 1-2 weeks for further management and monitoring.

## 2022-12-17 NOTE — DISCHARGE NOTE NURSING/CASE MANAGEMENT/SOCIAL WORK - PATIENT PORTAL LINK FT
You can access the FollowMyHealth Patient Portal offered by [facility] by registering at the following website: [email or web address]. By joining [facility]s FollowMyHealth portal, you will also be able to view your health information using other applications (apps) compatible with our system.

## 2022-12-17 NOTE — DISCHARGE NOTE PROVIDER - PROVIDER TOKENS
FREE:[LAST:[Your Primary Care Doctor],PHONE:[(   )    -],FAX:[(   )    -]],FREE:[LAST:[Your Cardiologist],PHONE:[(   )    -],FAX:[(   )    -]],PROVIDER:[TOKEN:[[id number]]],PROVIDER:[TOKEN:[[id number]]]

## 2022-12-17 NOTE — DISCHARGE NOTE PROVIDER - NSDCMRMEDTOKEN_GEN_ALL_CORE_FT
acetaminophen 325 mg oral tablet: 2 tab(s) orally every 6 hours, As needed, Temp greater or equal to 38C (100.4F), Mild Pain (1 - 3)  aspirin 81 mg oral delayed release tablet: 1 tab(s) orally once a day  guaiFENesin 100 mg/5 mL oral liquid: 10 milliliter(s) orally every 6 hours, As needed, Cough  oseltamivir 75 mg oral capsule: 1 cap(s) orally 2 times a day

## 2022-12-17 NOTE — DISCHARGE NOTE PROVIDER - NSFOLLOWUPCLINICS_GEN_ALL_ED_FT
[facility] Pulmonolgy and Sleep Medicine  Pulmonology  [address] [place] [address], [place] [address]  Phone: [phone]  Fax:

## 2022-12-17 NOTE — PROGRESS NOTE ADULT - SUBJECTIVE AND OBJECTIVE BOX
Name of Patient : REBEKAH CURIEL  MRN: [id number]  Date of visit: 12-16-22       Subjective: Patient seen and examined. No new events except as noted.   chills/fever and cough     REVIEW OF SYSTEMS:    CONSTITUTIONAL: + fever  EYES/ENT: No visual changes;  No vertigo or throat pain   NECK: No pain or stiffness  RESPIRATORY: No cough, wheezing, hemoptysis; No shortness of breath  CARDIOVASCULAR: No chest pain or palpitations  GASTROINTESTINAL: No abdominal or epigastric pain. No nausea, vomiting, or hematemesis; No diarrhea or constipation. No melena or hematochezia.  GENITOURINARY: No dysuria, frequency or hematuria  NEUROLOGICAL: No numbness or weakness  SKIN: No itching, burning, rashes, or lesions   All other review of systems is negative unless indicated above.    MEDICATIONS:  MEDICATIONS  (STANDING):  aspirin enteric coated 81 milliGRAM(s) Oral daily  enoxaparin Injectable 40 milliGRAM(s) SubCutaneous every 24 hours  oseltamivir 75 milliGRAM(s) Oral two times a day      PHYSICAL EXAM:  T(C): 37.7 (12-16-22 @ 16:09), Max: 38.9 (12-16-22 @ 13:00)  HR: 90 (12-16-22 @ 16:09) (89 - 98)  BP: 113/69 (12-16-22 @ 16:09) (111/69 - 142/94)  RR: 20 (12-16-22 @ 16:09) (16 - 20)  SpO2: 100% (12-16-22 @ 16:09) (98% - 100%)  Wt(kg): --  I&O's Summary        Appearance: Normal	  HEENT:  PERRLA   Lymphatic: No lymphadenopathy   Cardiovascular: Normal S1 S2, no JVD  Respiratory: normal effort , clear  Gastrointestinal:  Soft, Non-tender  Skin: No rashes,  warm to touch  Psychiatry:  Mood & affect appropriate  Musculuskeletal: No edema      All labs, Imaging and EKGs personally reviewed                             13.8   6.63  )-----------( 280      ( 16 Dec 2022 06:52 )             43.3               12-16    136  |  100  |  14  ----------------------------<  107<H>  3.8   |  26  |  1.01    Ca    8.8      16 Dec 2022 06:52  Phos  3.6     12-16  Mg     2.00     12-16    TPro  7.4  /  Alb  4.6  /  TBili  0.2  /  DBili  x   /  AST  23  /  ALT  25  /  AlkPhos  81  12-15           CARDIAC MARKERS ( 15 Dec 2022 23:26 )  x     / x     / 92 U/L / x     / 1.5 ng/mL                                
Cardiovascular Disease Progress Note  Date of Service: 12-17-22 @ 11:22    Overnight events: Fevers noted.  Patient denies chest pain or SOB.    Otherwise review of systems negative    Objective Findings:  T(C): 37.8 (12-17-22 @ 10:47), Max: 38.9 (12-16-22 @ 13:00)  HR: 99 (12-17-22 @ 10:10) (84 - 99)  BP: 124/89 (12-17-22 @ 10:10) (100/64 - 135/85)  RR: 17 (12-17-22 @ 10:10) (17 - 20)  SpO2: 98% (12-17-22 @ 10:10) (96% - 100%)  Wt(kg): --  Daily     Daily       Physical Exam:  Gen: NAD; Patient resting comfortably  HEENT: EOMI, Normocephalic/ atraumatic  CV: RRR, normal S1 + S2, no m/r/g  Lungs:  Normal respiratory effort; clear to auscultation bilaterally  Abd: soft, non-tender; bowel sounds present  Ext: No edema; warm and well perfused    Telemetry: Sinus    Laboratory Data:                        14.0   6.72  )-----------( 263      ( 17 Dec 2022 05:35 )             44.4     12-17    138  |  101  |  14  ----------------------------<  112<H>  3.9   |  26  |  0.87    Ca    9.1      17 Dec 2022 05:35  Phos  3.6     12-17  Mg     2.00     12-17    TPro  7.4  /  Alb  4.6  /  TBili  0.2  /  DBili  x   /  AST  23  /  ALT  25  /  AlkPhos  81  12-15      CARDIAC MARKERS ( 15 Dec 2022 23:26 )  x     / x     / 92 U/L / x     / 1.5 ng/mL          Inpatient Medications:  MEDICATIONS  (STANDING):  aspirin enteric coated 81 milliGRAM(s) Oral daily  enoxaparin Injectable 40 milliGRAM(s) SubCutaneous every 24 hours  oseltamivir 75 milliGRAM(s) Oral two times a day      Assessment: 52year old man with reported CAD presents with chest pain noted to have influenza.     Plan of Care:    #Chest pain-  - ACS ruled out  - EKG shows sinus rhythm with a LAFB and no ischemic changes  - TTE noted- normal LV wall motion.   - Symptoms likely due to persistent fevers and acute influenza.   - Stress testing can be done as outpatient once patient has recovered from viral infection  - Continue ASA    #Influenza  - Management as per primary team    #Obesity  - Lifestyle modifications    #ACP (advance care planning)-  Advanced care planning was discussed with the patient.  Advance care planning forms were discussed.   Echo findings were discussed in detail and all questions were answered. 30 additional minutes spent addressing advance care plans.    Over 55 minutes spent on total encounter; more than 50% of the visit was spent counseling and/or coordinating care by the attending physician.      Cliff Kamara MD [facility]  Cardiovascular Disease  [phone]

## 2022-12-17 NOTE — DISCHARGE NOTE PROVIDER - CARE PROVIDER_API CALL
Your Primary Care Doctor,   Phone: (   )    -  Fax: (   )    -  Follow Up Time:     Your Cardiologist,   Phone: (   )    -  Fax: (   )    -  Follow Up Time:     Edwardo Moncada (DO)  [address]  [place] [address]  Phone: [phone]  Fax: [phone]  Follow Up Time:     Kevon Lopez (DO)  Cardiovascular Disease; Internal Medicine; Nuclear Cardiology  [address] [facility] [facility] [address]  [place] [address]  Phone: [phone]  Fax: [phone]  Follow Up Time:

## 2022-12-17 NOTE — DISCHARGE NOTE PROVIDER - NSDCFUADDAPPT_GEN_ALL_CORE_FT
Follow up with your primary care doctor within one week of discharge. If you do not have one, you can call the medicine clinic at [phone] to make an appointment.

## 2023-03-27 ENCOUNTER — EMERGENCY (EMERGENCY)
Facility: HOSPITAL | Age: 53
LOS: 1 days | Discharge: HOME | End: 2023-03-27
Admitting: EMERGENCY MEDICINE
Payer: MEDICAID

## 2023-03-27 VITALS
OXYGEN SATURATION: 98 % | SYSTOLIC BLOOD PRESSURE: 139 MMHG | HEART RATE: 76 BPM | RESPIRATION RATE: 16 BRPM | DIASTOLIC BLOOD PRESSURE: 97 MMHG | TEMPERATURE: 98 F

## 2023-03-27 DIAGNOSIS — Z98.89 OTHER SPECIFIED POSTPROCEDURAL STATES: Chronic | ICD-10-CM

## 2023-03-27 PROCEDURE — 99283 EMERGENCY DEPT VISIT LOW MDM: CPT
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
## 2023-03-27 NOTE — ED PROVIDER NOTE - PROGRESS NOTE DETAILS
STEPHANE Clements I was not involved in the care of this patient and am only entering information to back log for downtime that took place starting 3/24/23 at 2300 and carrying through 3/28/23. See downtime chart.
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
## 2023-03-27 NOTE — ED PROVIDER NOTE - NSFOLLOWUPINSTRUCTIONS_ED_ALL_ED_FT
Rest, drink plenty of fluids.  Advance activity as tolerated.  Continue all previously prescribed medications as directed.  Follow up with your primary care physician in 48-72 hours- bring copies of your results.  Return to the ER for worsening or persistent symptoms, and/or ANY NEW OR CONCERNING SYMPTOMS. If you have issues obtaining follow up, please call: [phone] to obtain a doctor or specialist who takes your insurance in your area.  You may call [phone] to make an appointment with the internal medicine clinic.

## 2023-03-27 NOTE — ED PROVIDER NOTE - RIGHT FACE
mild right sided infraorbital swelling or redness. no entrapment noted, vision 20/20 on examination, mild infraorbital tenderness noted.
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
## 2023-08-23 NOTE — ED PROVIDER NOTE - DISCHARGE DATE
27-Mar-2023 Erivedge Counseling- I discussed with the patient the risks of Erivedge including but not limited to nausea, vomiting, diarrhea, constipation, weight loss, changes in the sense of taste, decreased appetite, muscle spasms, and hair loss.  The patient verbalized understanding of the proper use and possible adverse effects of Erivedge.  All of the patient's questions and concerns were addressed.

## 2023-10-02 ENCOUNTER — INPATIENT (INPATIENT)
Facility: HOSPITAL | Age: 53
LOS: 1 days | Discharge: HOME | End: 2023-10-04
Attending: INTERNAL MEDICINE | Admitting: INTERNAL MEDICINE
Payer: MEDICAID

## 2023-10-02 VITALS
OXYGEN SATURATION: 100 % | DIASTOLIC BLOOD PRESSURE: 88 MMHG | RESPIRATION RATE: 20 BRPM | SYSTOLIC BLOOD PRESSURE: 129 MMHG | TEMPERATURE: 98 F | HEART RATE: 71 BPM

## 2023-10-02 DIAGNOSIS — Z98.89 OTHER SPECIFIED POSTPROCEDURAL STATES: Chronic | ICD-10-CM

## 2023-10-02 PROCEDURE — 99285 EMERGENCY DEPT VISIT HI MDM: CPT

## 2023-10-02 NOTE — ED ADULT NURSE NOTE - OBJECTIVE STATEMENT
Roman RN: pt presents to ED A&04 ambulatory at baseline coming in for x1 month of midsternal intermittent chest pain. Patient also complaining of left eye redness, no trauma/pain to eye. Respirations even and unlabored. Lung sounds clear with equal chest rise bilaterally. ABD is soft, non tender, non distended with normal active bowel sounds No complaints of chest pain, headache, nausea, dizziness, vomiting  SOB, fever, chills verbalized. 20GRAC in place, labs sent, report given to primary RN Miguel.

## 2023-10-02 NOTE — ED ADULT TRIAGE NOTE - HISTORY OF COVID-19 VACCINATION
Problem list     Subjective   Phyllis Iyer is a 54 y.o. female     Chief Complaint   Patient presents with   • Shortness of Breath     presents for 6 month f/u   • Coronary Artery Disease       HPI         Problem list:     1. CAD  1.1 cardiac catheterization in 2011 with stenting to the mid LAD as well as proximal, mid, and distal RCA with medical management recommended  1.2 left heart catheterization January 2016 with stenting of the right coronary artery with nonobstructive disease otherwise  1.3 stress test November 2016 but no evidence ischemia and preserved LV function  1.4 cardiac catheterization 5/4/2018 by Dr. Malik because of non-ST elevation myocardial infarction demonstrating high-grade RCA disease status post stenting ×2.  60% IntraStent restenosis of the LAD with medical management recommended.  Normal LV function noted  1.5 cardiac catheterization July 2018 with stenting of the LAD because of refractory angina.  30 to 50% of the circumflex and RCA with medical management recommended  2.  Preserved systolic function  3.  Hypertension  4.  Dyslipidemia  5.  Diabetes mellitus  6.  Obstructive sleep apnea noncompliant with CPAP therapy      Patient is a 54-year-old female who presents back to the office for follow-up.  Patient has no chest discomfort.  She does note moderate levels of exertional dyspnea.  Over the period of the last year, she has noted significant dyspnea.  Patient has recurrent stenosis.  She has had multiple procedures performed for intervention.  We have placed on Repatha to help aggressively address risk factor modification.  She otherwise is doing well        Outpatient Encounter Medications as of 8/7/2019   Medication Sig Dispense Refill   • aspirin 81 MG tablet Take 1 tablet by mouth Daily. 30 tablet 11   • atorvastatin (LIPITOR) 80 MG tablet TAKE ONE TABLET BY MOUTH DAILY 30 tablet 11   • CloNIDine (CATAPRES) 0.1 MG tablet TAKE ONE TABLET BY MOUTH THREE TIMES A DAY 90 tablet 5    • clopidogrel (PLAVIX) 75 MG tablet Take 1 tablet by mouth Daily. 90 tablet 3   • Dapagliflozin-Metformin HCl ER (XIGDUO XR)  MG tablet sustained-release 24 hour Take  by mouth Daily.     • Evolocumab with Infusor 420 MG/3.5ML solution cartridge Inject 420 mg under the skin into the appropriate area as directed Every 30 (Thirty) Days. 1 cartridge. 11   • FLECTOR 1.3 % patch patch      • gabapentin (NEURONTIN) 600 MG tablet      • HYDROcodone-acetaminophen (NORCO) 7.5-325 MG per tablet Take 1 tablet by mouth Every 6 (Six) Hours As Needed for Moderate Pain (4-6).     • losartan (COZAAR) 25 MG tablet TAKE ONE TABLET BY MOUTH DAILY 30 tablet 5   • metoprolol tartrate (LOPRESSOR) 25 MG tablet Take  by mouth 2 (Two) Times a Day.     • nitroglycerin (NITROSTAT) 0.4 MG SL tablet Place  under the tongue. prn     • orphenadrine (NORFLEX) 100 MG 12 hr tablet      • OXcarbazepine (TRILEPTAL) 600 MG tablet Take 1 tablet by mouth 2 (Two) Times a Day. 60 tablet 11   • pantoprazole (PROTONIX) 40 MG EC tablet 2 (Two) Times a Day.     • promethazine (PHENERGAN) 25 MG tablet Take 25 mg by mouth Every 6 (Six) Hours As Needed for Nausea or Vomiting.     • traZODone (DESYREL) 150 MG tablet Take 1 tablet by mouth Every Night. 30 tablet 6   • TRESIBA FLEXTOUCH 100 UNIT/ML solution pen-injector injection Daily.       No facility-administered encounter medications on file as of 8/7/2019.        Sulfa antibiotics    Past Medical History:   Diagnosis Date   • Arthritis    • CAD (coronary artery disease)    • Chest tightness or pressure    • Complex partial seizure (CMS/HCC)    • Diabetes mellitus (CMS/HCC)    • Dyslipidemia    • FHx: migraine headaches    • GERD (gastroesophageal reflux disease)    • Hiatal hernia    • Hypercholesterolemia    • Hypertension    • Myocardial infarction (CMS/HCC)    • JOSE on CPAP    • Stroke syndrome    • Stroke syndrome        Social History     Socioeconomic History   • Marital status:       "Spouse name: Not on file   • Number of children: Not on file   • Years of education: Not on file   • Highest education level: Not on file   Tobacco Use   • Smoking status: Former Smoker   • Smokeless tobacco: Never Used   Substance and Sexual Activity   • Alcohol use: No       Family History   Problem Relation Age of Onset   • Heart disease Other    • Diabetes Other    • Cancer Other    • Stroke Other    • Coronary artery disease Mother    • Heart attack Mother    • Coronary artery disease Father        Review of Systems   Constitutional: Positive for chills and fatigue.   HENT: Positive for congestion and ear pain.         Sinus issues    Eyes: Positive for visual disturbance.   Respiratory: Positive for apnea, cough (congestion and allergies) and shortness of breath (with daily activity and times at rest).    Cardiovascular: Negative.  Negative for chest pain, palpitations and leg swelling.   Gastrointestinal: Negative.    Endocrine: Negative.    Genitourinary: Negative.    Musculoskeletal: Positive for arthralgias, back pain, myalgias and neck pain.   Skin: Negative.    Allergic/Immunologic: Positive for environmental allergies.   Neurological: Positive for dizziness and numbness (tingling in face one day last week).   Hematological: Bruises/bleeds easily.   Psychiatric/Behavioral: Positive for agitation and sleep disturbance (insomnia). The patient is nervous/anxious.    All other systems reviewed and are negative.      Objective   Vitals:    08/07/19 0947   BP: 132/88   BP Location: Left arm   Patient Position: Sitting   Pulse: 84   SpO2: 98%   Weight: 96.7 kg (213 lb 3.2 oz)   Height: 170.2 cm (67.01\")      /88 (BP Location: Left arm, Patient Position: Sitting)   Pulse 84   Ht 170.2 cm (67.01\")   Wt 96.7 kg (213 lb 3.2 oz)   SpO2 98%   BMI 33.38 kg/m²     Lab Results (most recent)     None          Physical Exam   Constitutional: She is oriented to person, place, and time. She appears well-developed " Vaccine status unknown and well-nourished. No distress.   HENT:   Head: Normocephalic and atraumatic.   Eyes: EOM are normal. Pupils are equal, round, and reactive to light.   Cardiovascular: Normal rate, regular rhythm, normal heart sounds and intact distal pulses. Exam reveals no gallop and no friction rub.   No murmur heard.  Pulmonary/Chest: Effort normal and breath sounds normal. No respiratory distress. She has no wheezes. She has no rales. She exhibits no tenderness.   Abdominal: She exhibits no distension. There is no guarding.   Musculoskeletal: Normal range of motion. She exhibits no edema.   Neurological: She is alert and oriented to person, place, and time. No cranial nerve deficit.   Skin: Skin is warm and dry. No rash noted. No erythema. No pallor.   Psychiatric: She has a normal mood and affect. Her behavior is normal.   Nursing note and vitals reviewed.      Procedure   Procedures       Assessment/Plan     Problems Addressed this Visit        Cardiovascular and Mediastinum    Coronary artery disease involving native coronary artery of native heart without angina pectoris    Relevant Orders    Adult Transthoracic Echo Complete W/ Cont if Necessary Per Protocol    Stress Test With Myocardial Perfusion One Day    Essential hypertension    Relevant Orders    Adult Transthoracic Echo Complete W/ Cont if Necessary Per Protocol    Stress Test With Myocardial Perfusion One Day       Respiratory    Shortness of breath - Primary    Relevant Orders    Adult Transthoracic Echo Complete W/ Cont if Necessary Per Protocol    Stress Test With Myocardial Perfusion One Day            Recommendation  1.  Patient with coronary disease with residual mild to moderate disease noted in the circumflex and RCA.  She is experiencing worsening shortness of breath which is concerning in the setting that she is a diabetic.  I feel will stratification is warranted.  2.  Stress test for risk stratification.  Echo to confirm normal systolic function, rule  out diastolic dysfunction and significant valvular disease as a source of her dyspnea.  3.  I would like to recheck lipid parameters since she is on Repatha.  She is also on 80 of Lipitor as well.  She is having some concern about the cost of Repatha and will working on that.  Once that becomes addressed, we will try to recheck laboratories.  We will see her back for follow-up after testing.  Follow-up with primary as scheduled                Patient's Body mass index is 33.38 kg/m². BMI is above normal parameters. Recommendations include: educational material.       Electronically signed by:

## 2023-10-03 DIAGNOSIS — R07.9 CHEST PAIN, UNSPECIFIED: ICD-10-CM

## 2023-10-03 LAB
ALBUMIN SERPL ELPH-MCNC: 4.4 G/DL — SIGNIFICANT CHANGE UP (ref 3.3–5)
ALP SERPL-CCNC: 76 U/L — SIGNIFICANT CHANGE UP (ref 40–120)
ALT FLD-CCNC: 23 U/L — SIGNIFICANT CHANGE UP (ref 4–41)
ANION GAP SERPL CALC-SCNC: 11 MMOL/L — SIGNIFICANT CHANGE UP (ref 7–14)
APTT BLD: 34.7 SEC — SIGNIFICANT CHANGE UP (ref 24.5–35.6)
AST SERPL-CCNC: 23 U/L — SIGNIFICANT CHANGE UP (ref 4–40)
BASOPHILS # BLD AUTO: 0.03 K/UL — SIGNIFICANT CHANGE UP (ref 0–0.2)
BASOPHILS NFR BLD AUTO: 0.4 % — SIGNIFICANT CHANGE UP (ref 0–2)
BILIRUB SERPL-MCNC: <0.2 MG/DL — SIGNIFICANT CHANGE UP (ref 0.2–1.2)
BUN SERPL-MCNC: 13 MG/DL — SIGNIFICANT CHANGE UP (ref 7–23)
CALCIUM SERPL-MCNC: 9.4 MG/DL — SIGNIFICANT CHANGE UP (ref 8.4–10.5)
CHLORIDE SERPL-SCNC: 101 MMOL/L — SIGNIFICANT CHANGE UP (ref 98–107)
CHOLEST SERPL-MCNC: 189 MG/DL — SIGNIFICANT CHANGE UP
CO2 SERPL-SCNC: 27 MMOL/L — SIGNIFICANT CHANGE UP (ref 22–31)
CREAT SERPL-MCNC: 1.43 MG/DL — HIGH (ref 0.5–1.3)
EGFR: 58 ML/MIN/1.73M2 — LOW
EOSINOPHIL # BLD AUTO: 0.12 K/UL — SIGNIFICANT CHANGE UP (ref 0–0.5)
EOSINOPHIL NFR BLD AUTO: 1.6 % — SIGNIFICANT CHANGE UP (ref 0–6)
GLUCOSE SERPL-MCNC: 130 MG/DL — HIGH (ref 70–99)
HCT VFR BLD CALC: 41.6 % — SIGNIFICANT CHANGE UP (ref 39–50)
HDLC SERPL-MCNC: 44 MG/DL — SIGNIFICANT CHANGE UP
HGB BLD-MCNC: 13.4 G/DL — SIGNIFICANT CHANGE UP (ref 13–17)
IANC: 4.4 K/UL — SIGNIFICANT CHANGE UP (ref 1.8–7.4)
IMM GRANULOCYTES NFR BLD AUTO: 0.1 % — SIGNIFICANT CHANGE UP (ref 0–0.9)
INR BLD: 0.91 RATIO — SIGNIFICANT CHANGE UP (ref 0.85–1.18)
LIPID PNL WITH DIRECT LDL SERPL: 114 MG/DL — HIGH
LYMPHOCYTES # BLD AUTO: 2.28 K/UL — SIGNIFICANT CHANGE UP (ref 1–3.3)
LYMPHOCYTES # BLD AUTO: 30.7 % — SIGNIFICANT CHANGE UP (ref 13–44)
MCHC RBC-ENTMCNC: 25.5 PG — LOW (ref 27–34)
MCHC RBC-ENTMCNC: 32.2 GM/DL — SIGNIFICANT CHANGE UP (ref 32–36)
MCV RBC AUTO: 79.1 FL — LOW (ref 80–100)
MONOCYTES # BLD AUTO: 0.58 K/UL — SIGNIFICANT CHANGE UP (ref 0–0.9)
MONOCYTES NFR BLD AUTO: 7.8 % — SIGNIFICANT CHANGE UP (ref 2–14)
NEUTROPHILS # BLD AUTO: 4.4 K/UL — SIGNIFICANT CHANGE UP (ref 1.8–7.4)
NEUTROPHILS NFR BLD AUTO: 59.4 % — SIGNIFICANT CHANGE UP (ref 43–77)
NON HDL CHOLESTEROL: 145 MG/DL — HIGH
NRBC # BLD: 0 /100 WBCS — SIGNIFICANT CHANGE UP (ref 0–0)
NRBC # FLD: 0 K/UL — SIGNIFICANT CHANGE UP (ref 0–0)
PLATELET # BLD AUTO: 332 K/UL — SIGNIFICANT CHANGE UP (ref 150–400)
POTASSIUM SERPL-MCNC: 3.5 MMOL/L — SIGNIFICANT CHANGE UP (ref 3.5–5.3)
POTASSIUM SERPL-SCNC: 3.5 MMOL/L — SIGNIFICANT CHANGE UP (ref 3.5–5.3)
PROT SERPL-MCNC: 7.3 G/DL — SIGNIFICANT CHANGE UP (ref 6–8.3)
PROTHROM AB SERPL-ACNC: 10.2 SEC — SIGNIFICANT CHANGE UP (ref 9.5–13)
RBC # BLD: 5.26 M/UL — SIGNIFICANT CHANGE UP (ref 4.2–5.8)
RBC # FLD: 13.2 % — SIGNIFICANT CHANGE UP (ref 10.3–14.5)
SODIUM SERPL-SCNC: 139 MMOL/L — SIGNIFICANT CHANGE UP (ref 135–145)
TRIGL SERPL-MCNC: 154 MG/DL — HIGH
TROPONIN T, HIGH SENSITIVITY RESULT: 6 NG/L — SIGNIFICANT CHANGE UP
TROPONIN T, HIGH SENSITIVITY RESULT: <6 NG/L — SIGNIFICANT CHANGE UP
WBC # BLD: 7.42 K/UL — SIGNIFICANT CHANGE UP (ref 3.8–10.5)
WBC # FLD AUTO: 7.42 K/UL — SIGNIFICANT CHANGE UP (ref 3.8–10.5)

## 2023-10-03 PROCEDURE — 71046 X-RAY EXAM CHEST 2 VIEWS: CPT | Mod: 26

## 2023-10-03 PROCEDURE — 99223 1ST HOSP IP/OBS HIGH 75: CPT | Mod: 25

## 2023-10-03 PROCEDURE — 99236 HOSP IP/OBS SAME DATE HI 85: CPT

## 2023-10-03 RX ORDER — ASPIRIN/CALCIUM CARB/MAGNESIUM 324 MG
81 TABLET ORAL DAILY
Refills: 0 | Status: DISCONTINUED | OUTPATIENT
Start: 2023-10-03 | End: 2023-10-04

## 2023-10-03 RX ORDER — LANOLIN ALCOHOL/MO/W.PET/CERES
3 CREAM (GRAM) TOPICAL AT BEDTIME
Refills: 0 | Status: DISCONTINUED | OUTPATIENT
Start: 2023-10-03 | End: 2023-10-04

## 2023-10-03 RX ORDER — ACETAMINOPHEN 500 MG
650 TABLET ORAL EVERY 6 HOURS
Refills: 0 | Status: DISCONTINUED | OUTPATIENT
Start: 2023-10-03 | End: 2023-10-04

## 2023-10-03 RX ORDER — ASPIRIN/CALCIUM CARB/MAGNESIUM 324 MG
1 TABLET ORAL
Qty: 0 | Refills: 0 | DISCHARGE

## 2023-10-03 RX ORDER — HEPARIN SODIUM 5000 [USP'U]/ML
5000 INJECTION INTRAVENOUS; SUBCUTANEOUS EVERY 12 HOURS
Refills: 0 | Status: DISCONTINUED | OUTPATIENT
Start: 2023-10-03 | End: 2023-10-04

## 2023-10-03 NOTE — H&P ADULT - PROBLEM SELECTOR PLAN 3
-Periodically has these symptoms   -patient has history of blood per rectum, painless  -patient needs follow up with GI regarding need of colonoscopy

## 2023-10-03 NOTE — H&P ADULT - PROBLEM SELECTOR PLAN 5
DVT prophylaxis: heparin 5000 units every 12 hours, adjust as kidney function improves  GI prophylaxis: none  Diet: NPO for LHC  Full code

## 2023-10-03 NOTE — ED CDU PROVIDER INITIAL DAY NOTE - NSTIMEPROVIDERCAREINITIATE_GEN_ER
----- Message from Mariza Troncoso sent at 9/21/2022 12:53 PM CDT -----  Contact: Pt [phone]  Patient is returning a phone call.  Who left a message for the patient: Jena Zhou LPN  Does patient know what this is regarding:  Results  Would you like a call back, or a response through your [facility] portal?:   Call back  Comments:     Please call and advise.    Thank You         02-Oct-2023 23:10

## 2023-10-03 NOTE — H&P ADULT - PROBLEM SELECTOR PLAN 4
-Cr 1.4 from 0.9 previously   -UA, urine Na and urine protein/Cr ordered, Renal US ordered to r/o obstruction   -continue to monitor urine output every 4 hours  -avoid nephrotoxins.   -continue to monitor BMP daily   -consider renal consult based on results of above.

## 2023-10-03 NOTE — H&P ADULT - NSHPLABSRESULTS_GEN_ALL_CORE
13.4   7.42  )-----------( 332      ( 02 Oct 2023 23:17 )             41.6     139  |  101  |  13  ----------------------------<  130<H>     10-02  3.5   |  27  |  1.43<H>    Ca    9.4      02 Oct 2023 23:17    TPro  7.3  /  Alb  4.4  /  TBili  <0.2  /  DBili  x   /  AST  23  /  ALT  23  /  AlkPhos  76  10-02                hs Troponin, T - 6 ng/L (10-03-23 @ 02:10)

## 2023-10-03 NOTE — CONSULT NOTE ADULT - ASSESSMENT
Mr. Maldonado is a 53 year-old-man presented to [facility] ED with progressive chest pain and abnormal EKG.   He reports recent progressive symptoms of exertional and non-exertional chest pain over the past year, which he describes as substernal chest tightness that intermittently radiates to the left jaw.   He also describes having intermittent episodes of dyspnea.   Denies recent illness.  Denies FH early CAD/CVA/SCD/VTE.  He has not had a recent cardiac assessment.    When evaluated in the ED this AM, the patient appeared clinically and hemodynamically stable.  No acute events noted on bedside telemetry.  CXR noted clear lungs.    CDU team has already arranged for the patient to undergo evaluation with echocardiogram and nuclear stress testing.    Will follow with you.

## 2023-10-03 NOTE — ED CDU PROVIDER INITIAL DAY NOTE - ATTENDING APP SHARED VISIT CONTRIBUTION OF CARE
For the complete details of the history and physical please see my provider note.  Briefly patient with a questionable past medical history of CAD presenting with chest pain and abnormal EKG from PMD office.  In the emergency department the patient's EKG showed no acute ischemic changes.  Laboratory studies were unremarkable.  Patient coming to CDU for telemetry monitoring stress test echo and cardiology consultation.    Vitals: I have reviewed the patients vital signs  General: nontoxic appearing  HEENT: Atraumatic, normocephalic, airway patent  Eyes: EOMI, tracking appropriately  Neck: no tracheal deviation  Chest/Lungs: no trauma, symmetric chest rise, speaking in complete sentences,  no resp distress  Heart: skin and extremities well perfused, regular rate and rhythm  Neuro: A+Ox3, appears non focal  MSK: no deformities  Skin: no cyanosis, no jaundice   Psych:  Normal mood and affect

## 2023-10-03 NOTE — H&P ADULT - ASSESSMENT
Mr. Maldonado is a 53 year old M who presents at the request of his PCP for evaluation of ACS given chest pain and abnormal EKG. Stress test performed yesterday shows medium size severe defect in mid to distal anterior and mid to distal anterolateral wall that are predominantly fixed suggestive of infarction with min meghna-infarct ischemia.

## 2023-10-03 NOTE — ED CDU PROVIDER INITIAL DAY NOTE - OBJECTIVE STATEMENT
52M with PMHx CAD? presenting with CP which he states has been intermittent for the past month. Pt is a poor historian. Pt endorses having an abnormal EKG several years ago and being sent to ED by his PMD. Pt states he has a H/O an "MI' but denies H/O angiogram or stents placed but remembers a stress test that was normal years ago. He endorses chronic CP that comes and goes. However this last few days it has been more frequent and sometimes associated with exertion and relieved with rest. Also has some SOB on and off for years. Pt was admitted for a similar presentation in Dec of 2022 and had an echo but did not have a stress test as he was incidentally flu +. Pt states he has not followed with a cardiologist since discharge. Pt also notes atraumatic L eye redness for the past 2 days, denies visual disturbance, denies any other sx or acute complaints, pt is currently resting comfortably.

## 2023-10-03 NOTE — ED PROVIDER NOTE - CLINICAL SUMMARY MEDICAL DECISION MAKING FREE TEXT BOX
52M with PMHx CAD? presenting with CP which he states has been intermittent for the past month. Pt is a poor historian. Pt endorses having an abnormal EKG several years ago and being sent to ED by his PMD. Pt states he has a H/O an "MI' but denies H/O angiogram or stents placed but remembers a stress test that was normal years ago. He endorses chronic CP that comes and goes. Plan is CXR to eval for consolidation or pneumothorax, will obtain EKG and a troponin to eval for an NSTEMI and will obtain labs to eval for anemia or electrolyte disturbance. Plan for CDU placement for echo and telemetry and stress testing to eval for a cardiogenic etiology of the patient's symptoms.

## 2023-10-03 NOTE — ED CDU PROVIDER DISPOSITION NOTE - CLINICAL COURSE
53 law/o male with no pmhx presents to ED c/o chest pain. sent to cdu for stress test, echo. stress test with evidence of anterior and lateral wall infarct and ischemia. spoke with Dr. Goetz will admit for cath tomorrow. pt hemodynamically stable.

## 2023-10-03 NOTE — H&P ADULT - NSHPREVIEWOFSYSTEMS_GEN_ALL_CORE
Review of Systems:   CONSTITUTIONAL: No fever, weight loss  EYES: No eye pain, visual disturbances, or discharge  ENMT:  +difficulty hearing, no tinnitus, no vertigo; No sinus or throat pain  RESPIRATORY: +SOB. No cough, no wheezing, no chills or hemoptysis  CARDIOVASCULAR: +chest pain, no palpitations, no dizziness, no leg swelling  GASTROINTESTINAL: No abdominal or epigastric pain. No nausea, vomiting, or hematemesis; No diarrhea or constipation. No melena. +hematochezia.  GENITOURINARY: No dysuria, frequency, hematuria, or incontinence  NEUROLOGICAL: No headaches, memory loss, loss of strength, numbness, or tremors  SKIN: No itching, burning, rashes, or lesions   ENDOCRINE: No heat or cold intolerance; No hair loss  MUSCULOSKELETAL: No joint pain or swelling; No muscle, back pain  PSYCHIATRIC: No depression, anxiety, mood swings, or difficulty sleeping

## 2023-10-03 NOTE — H&P ADULT - PROBLEM SELECTOR PLAN 1
-intermittent chest pain for a year with associated jaw pain  -EKG with no ST/T changes  -TTE showed the following normal systolic function left and right. trace MR and EF 69%.   -Stress test showed medium size severe defect in mid to distal anterior and mid to distal anterolateral wall that are predominantly fixed suggestive of infarction with min meghna-infarct ischemia.  -Cardiology with plans for [facility] tomorrow, NPO at midnight, patient will need counseling regarding compliance with medication if patient has stent placed  -cardiac monitoring

## 2023-10-03 NOTE — H&P ADULT - HISTORY OF PRESENT ILLNESS
Mr. Maldonado is a 53 year old M who presents at the request of his PCP for evaluation of ACS given chest pain and abnormal EKG. He reports a history of exertional and non-exertional chest pain over the last year, which he describes chest tightness in the left chest that intermittently radiates to the left jaw. He describes intermittent shortness of breath with singing. He also states associated with this pain is muscle cramps in his hands and feet/numbness. He denies nausea, vomiting, and diaphoresis, palpation, LE edema. He also complains of snoring and apnea which wakes him up, which he has not had evaluated. He was initially admitted to the CDU for TTE/stress test. TTE showed the following normal systolic function left and right. trace MR and EF 69%. EKG showed the following normal sinus, HR 86, LAD, No ST/T changes. QTc 437 ms, and QRS 92 ms and HI interval 148 ms. Stress test showed medium size severe defect in mid to distal anterior and mid to distal anterolateral wall that are predominantly fixed suggestive of infarction with min meghna-infarct ischemia. He also reports he is on metoprolol and a few other medications that he takes occasionally. He also reports he has some blood on the toilet paper when he has a bowel movement, denies any straining with this.

## 2023-10-03 NOTE — ED PROVIDER NOTE - OBJECTIVE STATEMENT
52M with PMHx CAD? presenting with CP which he states has been intermittent for the past month. Pt is a poor historian. Pt endorses having an abnormal EKG several years ago and being sent to ED by his PMD. Pt states he has a H/O an "MI' but denies H/O angiogram or stents placed but remembers a stress test that was normal years ago. He endorses chronic CP that comes and goes. However this last few days it has been more frequent and sometimes associated with exertion and relieved with rest. Also has some SOB on and off for years. Pt was admitted for a similar presentation in Dec of 2022 and had an echo but did not have a stress test as he was incidentally flu +. Pt states he has not followed with a cardiologist since discharge. Pt also notes atraumatic L eye redness for the past 2 days, denies visual disturbance, denies any other sx or acute complaints.

## 2023-10-03 NOTE — ED CDU PROVIDER INITIAL DAY NOTE - CLINICAL SUMMARY MEDICAL DECISION MAKING FREE TEXT BOX
52M with PMHx CAD? presenting with CP which he states has been intermittent for the past month. Pt is a poor historian. Pt endorses having an abnormal EKG several years ago and being sent to ED by his PMD. Pt states he has a H/O an "MI' but denies H/O angiogram or stents placed but remembers a stress test that was normal years ago. Plan is telemetry monitoring, echocardiogram and stress testing to eval for a cardiogenic evaluation of the patient's chest pain. Pt is well appearing, resting comfortably, no acute distress.

## 2023-10-03 NOTE — CONSULT NOTE ADULT - SUBJECTIVE AND OBJECTIVE BOX
Cardiology/Vascular Medicine Inpatient Consultation Note    Date of Admission:        CHIEF COMPLAINT:        HISTORY OF PRESENT ILLNESS:  HPI:          Allergies    penicillins (Unknown)    Intolerances    	    MEDICATIONS:                  PAST MEDICAL & SURGICAL HISTORY:  Sinus infection      S/P ear surgery          FAMILY HISTORY:  No pertinent family history in first degree relatives        SOCIAL HISTORY:    [ ] Non-smoker  [ ] Smoker  [ ] Alcohol    REVIEW OF SYSTEMS:  CONSTITUTIONAL: No fever, weight loss, or fatigue  EYES: No eye pain, visual disturbances, or discharge  ENMT:  No difficulty hearing, tinnitus, vertigo; No sinus or throat pain  NECK: No pain or stiffness  RESPIRATORY: No cough, wheezing, chills or hemoptysis; No Shortness of Breath  CARDIOVASCULAR: No chest pain, palpitations, passing out, dizziness, or leg swelling  GASTROINTESTINAL: No abdominal or epigastric pain. No nausea, vomiting, or hematemesis; No diarrhea or constipation. No melena or hematochezia.  GENITOURINARY: No dysuria, frequency, hematuria, or incontinence  NEUROLOGICAL: No headaches, memory loss, loss of strength, numbness, or tremors  SKIN: No itching, burning, rashes, or lesions   LYMPH Nodes: No enlarged glands  ENDOCRINE: No heat or cold intolerance; No hair loss  MUSCULOSKELETAL: No joint pain or swelling; No muscle, back, or extremity pain  PSYCHIATRIC: No depression, anxiety, mood swings, or difficulty sleeping  HEME/LYMPH: No easy bruising, or bleeding gums  ALLERY AND IMMUNOLOGIC: No hives or eczema	    [ ] All others negative	  [ ] Unable to obtain    PHYSICAL EXAM:  T(C): 36.7 (10-03-23 @ 05:47), Max: 36.8 (10-03-23 @ 01:59)  HR: 67 (10-03-23 @ 05:47) (67 - 71)  BP: 130/90 (10-03-23 @ 05:47) (117/67 - 130/90)  RR: 18 (10-03-23 @ 05:47) (18 - 20)  SpO2: 100% (10-03-23 @ 05:47) (100% - 100%)  Wt(kg): --  I&O's Summary      Appearance: Normal	  HEENT:   Normal oral mucosa, PERRL, EOMI	  Lymphatic: No lymphadenopathy  Cardiovascular: Normal S1 S2, No JVD, No murmurs, No edema  Respiratory: Lungs clear to auscultation	  Psychiatry: A & O x 3, Mood & affect appropriate  Gastrointestinal:  Soft, Non-tender, + BS	  Skin: No rashes, No ecchymoses, No cyanosis	  Neurologic: Non-focal  Extremities: Normal range of motion, No clubbing, cyanosis or edema  Vascular: Peripheral pulses palpable 2+ bilaterally      LABS:	 	    CBC Full  -  ( 02 Oct 2023 23:17 )  WBC Count : 7.42 K/uL  Hemoglobin : 13.4 g/dL  Hematocrit : 41.6 %  Platelet Count - Automated : 332 K/uL  Mean Cell Volume : 79.1 fL  Mean Cell Hemoglobin : 25.5 pg  Mean Cell Hemoglobin Concentration : 32.2 gm/dL  Auto Neutrophil # : 4.40 K/uL  Auto Lymphocyte # : 2.28 K/uL  Auto Monocyte # : 0.58 K/uL  Auto Eosinophil # : 0.12 K/uL  Auto Basophil # : 0.03 K/uL  Auto Neutrophil % : 59.4 %  Auto Lymphocyte % : 30.7 %  Auto Monocyte % : 7.8 %  Auto Eosinophil % : 1.6 %  Auto Basophil % : 0.4 %    10-02    139  |  101  |  13  ----------------------------<  130<H>  3.5   |  27  |  1.43<H>    Ca    9.4      02 Oct 2023 23:17    TPro  7.3  /  Alb  4.4  /  TBili  <0.2  /  DBili  x   /  AST  23  /  ALT  23  /  AlkPhos  76  10-02      proBNP:   Lipid Profile: Cholesterol, [id number] mg/dL  Direct LDL--  HDL Cholesterol, serum44 mg/dL  Triglycerides, [id number] mg/dL<H>    HgA1c:   TSH:       CARDIAC MARKERS:            TELEMETRY: 	    ECG:   	  RADIOLOGY:  OTHER: 	      PREVIOUS DIAGNOSTIC CARDIOVASCULAR TESTING:      [ ]  Echocardiogram:  [ ]  Catheterization:  [ ]  Stress test:    [ ]  Vascular studies:  	  	     Cardiology/Vascular Medicine Inpatient Consultation Note    HISTORY OF PRESENT ILLNESS:    Mr. Maldonado is a 53 year-old-man presented to [facility] ED with progressive chest pain and abnormal EKG.   He reports recent progressive symptoms of exertional and non-exertional chest pain over the past year, which he describes as substernal chest tightness that intermittently radiates to the left jaw.   He also describes having intermittent episodes of dyspnea.   Denies recent illness.  Denies FH early CAD/CVA/SCD/VTE.  He has not had a recent cardiac assessment.    When evaluated in the ED this AM, the patient appeared clinically and hemodynamically stable.  No acute events noted on bedside telemetry.  CXR noted clear lungs.    CDU team has already arranged for the patient to undergo evaluation with echocardiogram and nuclear stress testing.    Will follow with you.    Allergies  penicillins (Unknown)    MEDICATIONS:    PAST MEDICAL & SURGICAL HISTORY:  Sinus infection  S/P ear surgery    FAMILY HISTORY:  No pertinent family history in first degree relatives    SOCIAL HISTORY:    As above, as per chart notes    REVIEW OF SYSTEMS:  As above, as per chart notes    PHYSICAL EXAM:  T(C): 36.7 (10-03-23 @ 05:47), Max: 36.8 (10-03-23 @ 01:59)  HR: 67 (10-03-23 @ 05:47) (67 - 71)  BP: 130/90 (10-03-23 @ 05:47) (117/67 - 130/90)  RR: 18 (10-03-23 @ 05:47) (18 - 20)  SpO2: 100% (10-03-23 @ 05:47) (100% - 100%)    Appearance: NAD  HEENT:   No apparent JVD  Cardiovascular: Normal S1 S2  Respiratory: Lungs clear to auscultation	  Psychiatry: Awake, alert  Neurologic: Non-focal  Extremities: No LE edema    LABS:	 	    CBC Full  -  ( 02 Oct 2023 23:17 )  WBC Count : 7.42 K/uL  Hemoglobin : 13.4 g/dL  Hematocrit : 41.6 %  Platelet Count - Automated : 332 K/uL  Mean Cell Volume : 79.1 fL  Mean Cell Hemoglobin : 25.5 pg  Mean Cell Hemoglobin Concentration : 32.2 gm/dL  Auto Neutrophil # : 4.40 K/uL  Auto Lymphocyte # : 2.28 K/uL  Auto Monocyte # : 0.58 K/uL  Auto Eosinophil # : 0.12 K/uL  Auto Basophil # : 0.03 K/uL  Auto Neutrophil % : 59.4 %  Auto Lymphocyte % : 30.7 %  Auto Monocyte % : 7.8 %  Auto Eosinophil % : 1.6 %  Auto Basophil % : 0.4 %    10-02    139  |  101  |  13  ----------------------------<  130<H>  3.5   |  27  |  1.43<H>    Ca    9.4      02 Oct 2023 23:17    TPro  7.3  /  Alb  4.4  /  TBili  <0.2  /  DBili  x   /  AST  23  /  ALT  23  /  AlkPhos  76  10-02      < from: Xray Chest 2 Views PA/Lat (10.03.23 @ 00:42) >    ACC: [id number] EXAM:  XR CHEST PA LAT 2V   ORDERED BY: LAUREN LASSITER     PROCEDURE DATE:  10/03/2023          INTERPRETATION:  EXAMINATION: XR CHEST PA AND LATERAL    CLINICAL INDICATION: Chest pain    TECHNIQUE: 2 views; Frontal and lateral views ofthe chest were obtained   from 10/3/2023 12:42 AM    COMPARISON: Chest x-ray 12/15/2022    FINDINGS:    The heart size is normal.  The lungs are clear without focal consolidations.  There is no pneumothorax or pleural effusion.    IMPRESSION:  Clear lungs.    --- End of Report ---          MOHAMUD FAIR MD; Resident Radiologist  This document has been electronically signed.  MACO LOYA MD; Attending Radiologist  This document has been electronically signed. Oct  3 2023  8:10AM    < end of copied text >

## 2023-10-03 NOTE — ED PROVIDER NOTE - EYES, MLM
L eye subconjunctival hemorrhage, pupils equal, round and reactive to light. Vision is 20/40 bilaterally.

## 2023-10-03 NOTE — H&P ADULT - NSHPPHYSICALEXAM_GEN_ALL_CORE
Vital Signs Last 24 Hrs  T(C): 36.5 (03 Oct 2023 21:18), Max: 36.8 (03 Oct 2023 01:59)  T(F): 97.7 (03 Oct 2023 21:18), Max: 98.2 (03 Oct 2023 01:59)  HR: 74 (03 Oct 2023 21:18) (60 - 81)  BP: 110/71 (03 Oct 2023 21:18) (110/71 - 134/91)  BP(mean): --  RR: 16 (03 Oct 2023 21:18) (16 - 18)  SpO2: 99% (03 Oct 2023 21:18) (99% - 100%)    Parameters below as of 03 Oct 2023 21:18  Patient On (Oxygen Delivery Method): room air        CONSTITUTIONAL: Well-groomed, in no apparent distress  EYES: No conjunctival or scleral injection, non-icteric; PERRLA and symmetric  ENMT: No external nasal lesions; nasal mucosa not inflamed; normal dentition; oral mucosa with moist membranes  NECK: Trachea midline without palpable neck mass; thyroid not enlarged and non-tender, scar posterior left ear c/d/i   RESPIRATORY: Breathing comfortably; lungs CTA without wheeze/rhonchi/rales  CARDIOVASCULAR: +S1S2, RRR, no M/G/R;  pedal pulses full and symmetric; no lower extremity edema bilaterally  GASTROINTESTINAL: No palpable masses or tenderness, +BS throughout, no rebound/guarding; no hepatosplenomegaly; no hernia palpated  MUSCULOSKELETAL: no digital clubbing or cyanosis; no paraspinal tenderness; examination of the  (head/neck, spine/ribs/pelvis, RUE, LUE, RLE, LLE) without misalignment, normal strength and tone of extremities  SKIN: No rashes or ulcers noted; no subcutaneous nodules or induration palpable  NEUROLOGIC: CN II-XII intact; sensation intact in LEs b/l to light touch  PSYCHIATRIC: A+O x 3; mood and affect appropriate; appropriate insight and judgment Vital Signs Last 24 Hrs  T(C): 36.5 (03 Oct 2023 21:18), Max: 36.8 (03 Oct 2023 01:59)  T(F): 97.7 (03 Oct 2023 21:18), Max: 98.2 (03 Oct 2023 01:59)  HR: 74 (03 Oct 2023 21:18) (60 - 81)  BP: 110/71 (03 Oct 2023 21:18) (110/71 - 134/91)  BP(mean): --  RR: 16 (03 Oct 2023 21:18) (16 - 18)  SpO2: 99% (03 Oct 2023 21:18) (99% - 100%)    Parameters below as of 03 Oct 2023 21:18  Patient On (Oxygen Delivery Method): room air        CONSTITUTIONAL: Well-groomed, in no apparent distress  EYES: No conjunctival or scleral injection, non-icteric; PERRLA and symmetric  ENMT: No external nasal lesions; nasal mucosa not inflamed; normal dentition; oral mucosa with moist membranes  NECK: Trachea midline without palpable neck mass; thyroid not enlarged and non-tender, scar posterior left ear c/d/i   RESPIRATORY: Breathing comfortably; lungs CTA without wheeze/rhonchi/rales  CARDIOVASCULAR: +S1S2, RRR, no M/G/R;  pedal pulses full and symmetric; no lower extremity edema bilaterally  GASTROINTESTINAL: No palpable masses or tenderness, +BS throughout, no rebound/guarding; no hepatosplenomegaly; no hernia palpated  MUSCULOSKELETAL: no digital clubbing or cyanosis; no paraspinal tenderness; examination of the  (head/neck, spine/ribs/pelvis, RUE, LUE, RLE, LLE) without misalignment, normal strength and tone of extremities  SKIN: No rashes or ulcers noted; no subcutaneous nodules or induration palpable  NEUROLOGIC: CN II-XII intact; sensation intact in LEs b/l to light touch  PSYCHIATRIC: A+O x 3; flat affect; appropriate insight and judgment

## 2023-10-03 NOTE — ED CDU PROVIDER INITIAL DAY NOTE - NSICDXFAMILYHX_GEN_ALL_CORE_FT
FAMILY HISTORY:  No pertinent family history in first degree relatives     Home Suture Removal Text: Patient was provided a home suture removal kit and will remove their sutures at home.  If they have any questions or difficulties they will call the office.

## 2023-10-03 NOTE — H&P ADULT - PROBLEM SELECTOR PLAN 2
patient previously on metoprolol, was taking intermittently and not every day, med-pharmacy messaged for med-rec   -continue to monitor BP

## 2023-10-03 NOTE — ED PROVIDER NOTE - ATTENDING APP SHARED VISIT CONTRIBUTION OF CARE
52-year-old male with past medical history of CAD for which she says he had an MI but does not recall any testing presents emerged from today to the emergency department after his PMD saw him and said he had an abnormal EKG.  He does have intermittent chest pain that has been more frequent over the last couple of days.    Vitals: I have reviewed the patients vital signs  General: nontoxic appearing  HEENT: Atraumatic, normocephalic, airway patent  Eyes: EOMI, tracking appropriately  Neck: no tracheal deviation  Chest/Lungs: no trauma, symmetric chest rise, speaking in complete sentences,  no resp distress  Heart: skin and extremities well perfused, regular rate and rhythm  Neuro: A+Ox3, appears non focal  MSK: no deformities  Skin: no cyanosis, no jaundice   Psych:  Normal mood and affect    52-year-old male past medical story of questionable CAD with chest pain and EKG that was abnormal.  Here EKG shows no signs of acute ischemia.  We will get laboratory studies as well as a chest x-ray.  Patient will likely need to come to CDU for provocative testing with his abnormal EKG.

## 2023-10-04 VITALS
TEMPERATURE: 97 F | SYSTOLIC BLOOD PRESSURE: 127 MMHG | DIASTOLIC BLOOD PRESSURE: 83 MMHG | OXYGEN SATURATION: 100 % | RESPIRATION RATE: 15 BRPM | HEART RATE: 75 BPM

## 2023-10-04 DIAGNOSIS — N17.9 ACUTE KIDNEY FAILURE, UNSPECIFIED: ICD-10-CM

## 2023-10-04 DIAGNOSIS — Z29.9 ENCOUNTER FOR PROPHYLACTIC MEASURES, UNSPECIFIED: ICD-10-CM

## 2023-10-04 DIAGNOSIS — K92.1 MELENA: ICD-10-CM

## 2023-10-04 DIAGNOSIS — I10 ESSENTIAL (PRIMARY) HYPERTENSION: ICD-10-CM

## 2023-10-04 DIAGNOSIS — I24.9 ACUTE ISCHEMIC HEART DISEASE, UNSPECIFIED: ICD-10-CM

## 2023-10-04 LAB
A1C WITH ESTIMATED AVERAGE GLUCOSE RESULT: 5.8 % — HIGH (ref 4–5.6)
ALBUMIN SERPL ELPH-MCNC: 4.2 G/DL — SIGNIFICANT CHANGE UP (ref 3.3–5)
ALP SERPL-CCNC: 70 U/L — SIGNIFICANT CHANGE UP (ref 40–120)
ALT FLD-CCNC: 22 U/L — SIGNIFICANT CHANGE UP (ref 4–41)
ANION GAP SERPL CALC-SCNC: 12 MMOL/L — SIGNIFICANT CHANGE UP (ref 7–14)
APPEARANCE UR: CLEAR — SIGNIFICANT CHANGE UP
APTT BLD: 32.4 SEC — SIGNIFICANT CHANGE UP (ref 24.5–35.6)
AST SERPL-CCNC: 18 U/L — SIGNIFICANT CHANGE UP (ref 4–40)
BASOPHILS # BLD AUTO: 0.05 K/UL — SIGNIFICANT CHANGE UP (ref 0–0.2)
BASOPHILS NFR BLD AUTO: 1 % — SIGNIFICANT CHANGE UP (ref 0–2)
BILIRUB SERPL-MCNC: 0.3 MG/DL — SIGNIFICANT CHANGE UP (ref 0.2–1.2)
BILIRUB UR-MCNC: NEGATIVE — SIGNIFICANT CHANGE UP
BUN SERPL-MCNC: 16 MG/DL — SIGNIFICANT CHANGE UP (ref 7–23)
CALCIUM SERPL-MCNC: 8.7 MG/DL — SIGNIFICANT CHANGE UP (ref 8.4–10.5)
CHLORIDE SERPL-SCNC: 100 MMOL/L — SIGNIFICANT CHANGE UP (ref 98–107)
CHOLEST SERPL-MCNC: 198 MG/DL — SIGNIFICANT CHANGE UP
CO2 SERPL-SCNC: 27 MMOL/L — SIGNIFICANT CHANGE UP (ref 22–31)
COLOR SPEC: YELLOW — SIGNIFICANT CHANGE UP
CREAT ?TM UR-MCNC: 131 MG/DL — SIGNIFICANT CHANGE UP
CREAT SERPL-MCNC: 0.85 MG/DL — SIGNIFICANT CHANGE UP (ref 0.5–1.3)
DIFF PNL FLD: NEGATIVE — SIGNIFICANT CHANGE UP
EGFR: 103 ML/MIN/1.73M2 — SIGNIFICANT CHANGE UP
EOSINOPHIL # BLD AUTO: 0.14 K/UL — SIGNIFICANT CHANGE UP (ref 0–0.5)
EOSINOPHIL NFR BLD AUTO: 2.7 % — SIGNIFICANT CHANGE UP (ref 0–6)
ESTIMATED AVERAGE GLUCOSE: 120 — SIGNIFICANT CHANGE UP
GLUCOSE SERPL-MCNC: 122 MG/DL — HIGH (ref 70–99)
GLUCOSE UR QL: NEGATIVE MG/DL — SIGNIFICANT CHANGE UP
HCT VFR BLD CALC: 44.3 % — SIGNIFICANT CHANGE UP (ref 39–50)
HDLC SERPL-MCNC: 45 MG/DL — SIGNIFICANT CHANGE UP
HGB BLD-MCNC: 14.3 G/DL — SIGNIFICANT CHANGE UP (ref 13–17)
IANC: 2.97 K/UL — SIGNIFICANT CHANGE UP (ref 1.8–7.4)
IMM GRANULOCYTES NFR BLD AUTO: 0.2 % — SIGNIFICANT CHANGE UP (ref 0–0.9)
INR BLD: 0.99 RATIO — SIGNIFICANT CHANGE UP (ref 0.85–1.18)
KETONES UR-MCNC: NEGATIVE MG/DL — SIGNIFICANT CHANGE UP
LEUKOCYTE ESTERASE UR-ACNC: NEGATIVE — SIGNIFICANT CHANGE UP
LIPID PNL WITH DIRECT LDL SERPL: 124 MG/DL — HIGH
LYMPHOCYTES # BLD AUTO: 1.56 K/UL — SIGNIFICANT CHANGE UP (ref 1–3.3)
LYMPHOCYTES # BLD AUTO: 30.2 % — SIGNIFICANT CHANGE UP (ref 13–44)
MCHC RBC-ENTMCNC: 25.6 PG — LOW (ref 27–34)
MCHC RBC-ENTMCNC: 32.3 GM/DL — SIGNIFICANT CHANGE UP (ref 32–36)
MCV RBC AUTO: 79.2 FL — LOW (ref 80–100)
MONOCYTES # BLD AUTO: 0.43 K/UL — SIGNIFICANT CHANGE UP (ref 0–0.9)
MONOCYTES NFR BLD AUTO: 8.3 % — SIGNIFICANT CHANGE UP (ref 2–14)
NEUTROPHILS # BLD AUTO: 2.97 K/UL — SIGNIFICANT CHANGE UP (ref 1.8–7.4)
NEUTROPHILS NFR BLD AUTO: 57.6 % — SIGNIFICANT CHANGE UP (ref 43–77)
NITRITE UR-MCNC: NEGATIVE — SIGNIFICANT CHANGE UP
NON HDL CHOLESTEROL: 153 MG/DL — HIGH
NRBC # BLD: 0 /100 WBCS — SIGNIFICANT CHANGE UP (ref 0–0)
NRBC # FLD: 0 K/UL — SIGNIFICANT CHANGE UP (ref 0–0)
PH UR: 6.5 — SIGNIFICANT CHANGE UP (ref 5–8)
PLATELET # BLD AUTO: 327 K/UL — SIGNIFICANT CHANGE UP (ref 150–400)
POTASSIUM SERPL-MCNC: 3.9 MMOL/L — SIGNIFICANT CHANGE UP (ref 3.5–5.3)
POTASSIUM SERPL-SCNC: 3.9 MMOL/L — SIGNIFICANT CHANGE UP (ref 3.5–5.3)
PROT ?TM UR-MCNC: 9 MG/DL — SIGNIFICANT CHANGE UP
PROT SERPL-MCNC: 7 G/DL — SIGNIFICANT CHANGE UP (ref 6–8.3)
PROT UR-MCNC: NEGATIVE MG/DL — SIGNIFICANT CHANGE UP
PROT/CREAT UR-RTO: 0.1 RATIO — SIGNIFICANT CHANGE UP (ref 0–0.2)
PROTHROM AB SERPL-ACNC: 11.1 SEC — SIGNIFICANT CHANGE UP (ref 9.5–13)
RBC # BLD: 5.59 M/UL — SIGNIFICANT CHANGE UP (ref 4.2–5.8)
RBC # FLD: 13.2 % — SIGNIFICANT CHANGE UP (ref 10.3–14.5)
SODIUM SERPL-SCNC: 139 MMOL/L — SIGNIFICANT CHANGE UP (ref 135–145)
SODIUM UR-SCNC: 111 MMOL/L — SIGNIFICANT CHANGE UP
SP GR SPEC: 1.02 — SIGNIFICANT CHANGE UP (ref 1–1.03)
TRIGL SERPL-MCNC: 144 MG/DL — SIGNIFICANT CHANGE UP
UROBILINOGEN FLD QL: 0.2 MG/DL — SIGNIFICANT CHANGE UP (ref 0.2–1)
WBC # BLD: 5.16 K/UL — SIGNIFICANT CHANGE UP (ref 3.8–10.5)
WBC # FLD AUTO: 5.16 K/UL — SIGNIFICANT CHANGE UP (ref 3.8–10.5)

## 2023-10-04 PROCEDURE — 99239 HOSP IP/OBS DSCHRG MGMT >30: CPT

## 2023-10-04 PROCEDURE — 93454 CORONARY ARTERY ANGIO S&I: CPT | Mod: 26

## 2023-10-04 RX ORDER — METOPROLOL TARTRATE 50 MG
1 TABLET ORAL
Refills: 0 | DISCHARGE

## 2023-10-04 RX ORDER — FLUTICASONE PROPIONATE 50 MCG
1 SPRAY, SUSPENSION NASAL
Refills: 0 | DISCHARGE

## 2023-10-04 RX ORDER — ATORVASTATIN CALCIUM 80 MG/1
1 TABLET, FILM COATED ORAL
Qty: 30 | Refills: 0
Start: 2023-10-04 | End: 2023-11-02

## 2023-10-04 RX ADMIN — HEPARIN SODIUM 5000 UNIT(S): 5000 INJECTION INTRAVENOUS; SUBCUTANEOUS at 07:18

## 2023-10-04 NOTE — PROGRESS NOTE ADULT - ASSESSMENT
Mr. Maldonado is a 53 year-old-man presented to [facility] ED with progressive chest pain and abnormal EKG.   He reports recent progressive symptoms of exertional and non-exertional chest pain over the past year, which he describes as substernal chest tightness that intermittently radiates to the left jaw.   He also describes having intermittent episodes of dyspnea.   Denies recent illness.  Denies FH early CAD/CVA/SCD/VTE.  He has not had a recent cardiac assessment.    When evaluated in the ED this AM, the patient appeared clinically and hemodynamically stable.  No acute events noted on bedside telemetry.  CXR noted clear lungs.    Reviewed TTE and NST findings with patient.  TTE noted normal biventricular systolic function.  NST noted: Medium-sized, severe defects in the mid to distal anterior and mid to distal anterolateral walls that are predominantly fixed suggestive of an infarction with minimal meghna-infarct ischemia.     Started on ASA, recommend adding atorvastatin.  Will arrange for [facility] today to assess coronary anatomy.

## 2023-10-04 NOTE — PROGRESS NOTE ADULT - SUBJECTIVE AND OBJECTIVE BOX
Cardiology/Vascular Medicine Inpatient Progress Note    No new cardiac complaints this AM.  Reviewed TTE and NST findings with patient.  NST noted: There are medium-sized, severe defect(s) in the mid to distal anterior and mid to distal anterolateral walls that are predominantly fixed suggestive of an infarction with minimal meghna-infarct ischemia.     Will arrange for [facility] today to assess coronary anatomy.    Vital Signs Last 24 Hrs  T(C): 36.3 (04 Oct 2023 05:43), Max: 36.6 (03 Oct 2023 14:29)  T(F): 97.4 (04 Oct 2023 05:43), Max: 97.9 (03 Oct 2023 14:29)  HR: 75 (04 Oct 2023 05:43) (54 - 81)  BP: 127/83 (04 Oct 2023 05:43) (103/61 - 134/91)  BP(mean): --  RR: 15 (04 Oct 2023 05:43) (15 - 18)  SpO2: 100% (04 Oct 2023 05:43) (99% - 100%)    Parameters below as of 04 Oct 2023 05:43  Patient On (Oxygen Delivery Method): room air    Appearance: NAD  HEENT:   No apparent JVD  Cardiovascular: Normal S1 S2  Respiratory: Lungs clear to auscultation	  Psychiatry: Awake, alert  Neurologic: Non-focal  Extremities: No LE edema    MEDICATIONS  (STANDING):  aspirin enteric coated 81 milliGRAM(s) Oral daily  heparin   Injectable 5000 Unit(s) SubCutaneous every 12 hours    MEDICATIONS  (PRN):  acetaminophen     Tablet .. 650 milliGRAM(s) Oral every 6 hours PRN Mild Pain (1 - 3)  melatonin 3 milliGRAM(s) Oral at bedtime PRN Insomnia    LABS:	 	                          14.3   5.16  )-----------( 327      ( 04 Oct 2023 06:30 )             44.3   10-04    139  |  100  |  x   ----------------------------<  x   3.9   |  x   |  x     Ca    9.4      02 Oct 2023 23:17    TPro  7.3  /  Alb  4.4  /  TBili  <0.2  /  DBili  x   /  AST  23  /  ALT  23  /  AlkPhos  76  10-02        < from: Xray Chest 2 Views PA/Lat (10.03.23 @ 00:42) >    ACC: [id number] EXAM:  XR CHEST PA LAT 2V   ORDERED BY: LAUREN LASSITER     PROCEDURE DATE:  10/03/2023          INTERPRETATION:  EXAMINATION: XR CHEST PA AND LATERAL    CLINICAL INDICATION: Chest pain    TECHNIQUE: 2 views; Frontal and lateral views ofthe chest were obtained   from 10/3/2023 12:42 AM    COMPARISON: Chest x-ray 12/15/2022    FINDINGS:    The heart size is normal.  The lungs are clear without focal consolidations.  There is no pneumothorax or pleural effusion.    IMPRESSION:  Clear lungs.    --- End of Report ---          MOHAMUD FAIR MD; Resident Radiologist  This document has been electronically signed.  MACO LOYA MD; Attending Radiologist  This document has been electronically signed. Oct  3 2023  8:10AM    < end of copied text >    echo< from: TTE W or WO Ultrasound Enhancing Agent (10.03.23 @ 10:03) >    TRANSTHORACIC ECHOCARDIOGRAM REPORT  ________________________________________________________________________________                                      _______       Pt. Name:       REBEKAH RIGGS           Study Date:   10/3/2023        [facility]  MRN:            [id number]                    Date of       1970                                               Birth:  Accession #:    [id number]                    Age:          53 years  Account#:       [id number]    Gender:       M  Heart Rate:                                  Height:       61.81 in (157.00 cm)  Rhythm:                                      Weight:       155.00 lb (70.31 kg)  Blood Pressure: 132/93 mmHg                  BSA/BMI:      1.71 m² / 28.52 kg/m²  ________________________________________________________________________________________  Referring Physician:    [id number] Not Available Doctor  Interpreting Physician: Madhu Brooke M.D.  Primary Sonographer:    Monik Chavez [facility]    CPT:              ECHO TTE WO CON COMP W DOPP - 53722.m  Indication(s):     Chest pain, unspecified - R07.9  Procedure:         Transthoracic echocardiogram with 2-D, M-mode and complete                     spectral and color flow Doppler.  Ordering Location: [facility]  Study Information: Image quality for this study is fair.    _______________________________________________________________________________________     CONCLUSIONS:      1. Left ventricular cavity is normally sized. Left ventricular wall thickness is normal. Left ventricular systolic function is normal with an ejection fraction of 69 % by Perry's method of disks.   2. Normal left ventricular diastolic function.   3. Normal right ventricular cavity size and systolic function.   4. Trace mitral regurgitation.    ________________________________________________________________________________________  FINDINGS:     Left Ventricle:  The left ventricular cavity is normally sized. Left ventricular wall thickness is normal. Left ventricular systolic function is normal with a calculated ejection fraction of 69 % by the Perry's biplane method of disks. There is normal left ventricular diastolic function.     Right Ventricle:  The right ventricular cavity is normal in size and normal systolic function. Tricuspid annular plane systolic excursion (TAPSE) is 1.7 cm (normal >=1.7 cm).     Left Atrium:  The left atrium is normal in size with an indexed volume of 31.12 ml/m².     Right Atrium:  The right atrium is normal in size.     Aortic Valve:  The aortic valve appears trileaflet with normal systolic excursion. There is no aortic valve stenosis. There is no evidence of aortic regurgitation.     Mitral Valve:  Structurally normal mitral valve with normal leaflet excursion. There is trace mitral regurgitation.     Tricuspid Valve:  Structurally normal tricuspid valve with normal leaflet excursion. There is trace tricuspid regurgitation.     Pulmonic Valve:  Structurally normal pulmonic valve with normal leaflet excursion. There is trace pulmonic regurgitation.  ____________________________________________________________________  Quantitative Data:  Left Ventricle Measurements: (Indexed to BSA)     IVSd (2D):   0.6 cm  LVPWd (2D):  0.7 cm  LVIDd (2D):  4.5 cm  LVIDs (2D):  2.8 cm  LV Mass:     89 g   52.1 g/m²  LV Vol d, MOD A2C: 70.6 ml 41.24 ml/m²  LV Vol d, MOD A4C: 76.9 ml 44.95 ml/m²  LV Vol d, MOD BP:  75.4 ml 44.08 ml/m²  LV Vol s, MOD A2C: 23.3 ml 13.61 ml/m²  LV Vol s, MOD A4C: 23.6 ml 13.77 ml/m²  LV Vol s,MOD BP:  23.5 ml 13.75 ml/m²  LVOT SV MOD BP:    51.9 ml  LV EF% MOD BP:     69 %     MV E Vmax:    0.78 m/s  MV A Vmax:    0.71 m/s  MV E/A:       1.10  e' lateral:   11.78 cm/s  e' medial:    7.76 cm/s  E/e' lateral: 6.64  E/e' medial:  10.08  E/e'Average: 8.00  MV DT:        196 msec    Aorta Measurements: (normal range) (Indexed to BSA)     Ao Root 3.0 cm  Ao Asc: 3.1 cm       Left Atrium Measurements: (Indexed to BSA)  LA Diam 2D: 3.32 cm    Right Ventricle Measurements:     TAPSE:      1.7cm  TV [place]. S': 9.92 cm/s       LVOT / RVOT/ Qp/Qs Data: (Indexed to BSA)  LVOT Diameter: 1.99 cm    Mitral Valve Measurements:     MV E Vmax: 0.8 m/s  MV A Vmax: 0.7 m/s  MV E/A:    1.1    ________________________________________________________________________________________  Electronically signed on 10/3/2023 at 12:05:52 PM by Madhu Brooke M.D.         *** Final ***    < end of copied text >  < from: Nuclear Stress Test-Exercise.. (10.03.23 @ 11:11) >    Nuclear Exercise Stress Test Report         Patient: REBEKAH RIGGS              Study Date: 10/3/2023                [facility]           MRN: [id number]                   Birth Date/Age: 1970 Age: 53                      years      Access #: [id number]                           Gender: M     Order Loc: [facility]                               Height: 157.5 cm/ 62.0 in  Request Phys: ED CDU                              Weight: 68.04 kg/ 150.00 lb     Procedure: Stress Tetrofosmin                BSA/ BMI: 1.69 m²/ 27.44 kg/m²    Indication: Chest Pain - R07.9           Admiss Status: Outpatient    Fellow/ACP: Lucero Crews                    Fellow/ACP:    ---------------------------------------------------------------------------------------------------------------------------------------------------------  Procedure Code: TETROFOSMIN PER DOSE - .m;STRESS TEST TRACING ONLY -                  32121.m;MYOCARDIAL SPECT MULTIPLE - 79883.m;TETROFOSMIN PER DOSE                  2nd - .m    ---------------------------------------------------------------------------------------------------------------------------------------------------------  History:     53 year old man with hypertension, DVT, MI presents with chest pain               and abnormal EKG.  Medications: None.  Allergies:   penicillin    --------------------------------------------------------------------------------------------------------------------------------------------------------Conclusions:   1. Qualitative Perfusion:      - medium-sized, severe defect(s) in the mid to distal anterior and mid to distal anterolateral walls that are predominantly fixed suggestive of an infarction with minimal meghna-infarct ischemia.   2. The left ventricular EF% during stress is 54 %.   3. There was diminished distal anterior wall systolic thickeing.   4. Normal right ventricular function.   5. The Duke Treadmill Score is 7.00, which is consistent with low risk (=5) for futurecardiac events.    ---------------------------------------------------------------------------------------------------------------------------------------------------------     Stress Test Results:                Protocol: Standard Gera           METS Achieved: 7           Stage Reached: 3       Exercise Duration: 7 min and 56 sec.              Heart Rate: Resting 73 bpm, Stress peak 136 bpm (83 % max predicted)             HR Response: Normal.          Blood Pressure: Resting 140/86 mmHg, Stress max 185/84 mmHg             BP Response: Normal.       Exercise Capacity: Fair.      Pretest Chest Pain: None.  Stress Test Chest Pain: No chest pain during exercise.  Symptoms During Stress: No symptoms.  Reason for Termination: Fatigue.    Duke Treadmill Score: low risk       +--------------+--------------+--------------+---------+-----------+  |Treadmill Time|  Treadmill   |  Treadmill   |  Heart  |   Blood   |  |              | Speed (mph)  |    Grade     |  Rate   | Pressure  |  |   |              |              |  (bpm)  |  (mmHg)   |  +--------------+--------------+--------------+---------+-----------+  |   Baseline   |   Standing   |     0.0%     |   75    |  140/86   |  +--------------+--------------+--------------+---------+-----------+  |   2:00 min   |     1.7      |     10%      |   109   |           |  +--------------+--------------+--------------+---------+-----------+  |   3:00 min   |     2.5      |     12%      |   111   |  175/78   |  +--------------+--------------+--------------+---------+-----------+  |   6:00 min   |     2.5      |     12%      |   136   |           |  +--------------+--------------+--------------+---------+-----------+  |   7:54 min   |     2.6      |     12%      |   133   |  175/82  |  +--------------+--------------+--------------+---------+-----------+  |Recovery 1:00 |--------------|--------------|   113   |  145/89   |  |     min      |              |              |         |           |  +--------------+--------------+--------------+---------+-----------+  |Recovery 2:00 |--------------|--------------|   100   |  141/84   |  |     min      |              |              |         |           |  +--------------+--------------+--------------+---------+-----------+  |Recovery 3:00 |--------------|--------------|   97    |  139/96   |  |     min      |              |              |         |           |  +--------------+--------------+--------------+---------+-----------+  |Recovery 4:00 |--------------|--------------| 99    |  118/93   |  |     min      |              |              |         |           |  +--------------+--------------+--------------+---------+-----------+  |   4:35 min   |--------------|--------------|   96    |  128/84   |  +--------------+--------------+--------------+---------+-----------+       ---------------------------------------------------------------------------------------------------------------------------------------------------------Electrocardiogram:  Baseline electrocardiogram: Normal sinus rhythm at a rate of 73 bpm with Q wave  1, AVL, V2 and no sigificant ST abnomalities.  Stress electrocardiogram: No significant ischemic ST segment changes. No  ischemic ST segment changes.       Chest Pain:  No chest pain during exercise.     Heart rate and blood pressure:  The heart rate response was normal. The blood pressure response was normal.     Exercise Capacity:  The patient achieved 7 METS, which is consistent with fair exercise capacity.  ---------------------------------------------------------------------------------------------------------------------------------------------------------  Procedure:  The patient was given 24.7 mCi of TC99M Tetrofosmin intravenously at rest on 10/3/2023 at 12:25:00 PM. Approximately 60 minutes later, solid state SPECT images were obtained, with patient in supine position. 7.6 mCi of TC99M Tetrofosmin was given intravenously at stress on 10/3/2023 at 11:15:57 AM. After 15 min minutes, gated solid state SPECT images were obtained and assessed for myocardial perfusion and function, with patient in supine position and prone position.  ---------------------------------------------------------------------------------------------------------------------------------------------------------  Perfusion:  Qualitative Findings:  There are medium-sized, severe defect(s) in the mid to distal anterior and mid to distal anterolateral walls that are predominantly fixed suggestive of an infarction with minimal meghna-infarct ischemia.        Summed Score: 12 11 1       Quantitative Imaging Findings    TID Stress  1.02  TID greater than 1.2-1.25 is generally considered abnormal.       Left Ventricular Motion: There was diminished distal anterior wall systolic thickeing.  ---------------------------------------------------------------------------------------------------------------------------------------------------------     Ventricular Function: The left ventricle is normal in function and normal in size. The stress left ventricular EF% is 54 %. The stress end diastolic volume is 85 ml and systolic volume is 39 ml. There is normal right ventricular function.     Critical Findings: Dr. Ishaan Goetz was informed of the findings by Arvind Carroll MD by 2-way electronic messageon 10/3/2023 at 4:10:56 PM.       Stress Supervising Provider: Electronically signed by Arvind Carroll MD  Interpreting Provider: Electronically signed on 10/3/2023 at 4:12:08 PM by Arvind Carroll MD         *** Final ***    < end of copied text >

## 2023-10-04 NOTE — DISCHARGE NOTE PROVIDER - HOSPITAL COURSE
53 year-old-man presented to [facility] ED with progressive chest pain and abnormal EKG.     CXR -clear lungs.  TTE noted normal biventricular systolic function.  NST noted: Medium-sized, severe defects in the mid to distal anterior and mid to distal anterolateral walls that are predominantly fixed suggestive of an infarction with minimal meghna-infarct ischemia.    s/p - LHC: NCA, RRA accessed    discussed with attending ready for discharge

## 2023-10-04 NOTE — DISCHARGE NOTE PROVIDER - NSDCMRMEDTOKEN_GEN_ALL_CORE_FT
aspirin 81 mg oral delayed release tablet: 1 tab(s) orally once a day  atorvastatin 10 mg oral tablet: 1 tab(s) orally once a day  fluticasone 50 mcg/inh nasal spray: 1 spray(s) in each nostril 2 times a day as needed  metoprolol succinate 25 mg oral tablet, extended release: 1 tab(s) orally once a day

## 2023-10-04 NOTE — DISCHARGE NOTE NURSING/CASE MANAGEMENT/SOCIAL WORK - PATIENT PORTAL LINK FT
You can access the FollowMyHealth Patient Portal offered by [facility] by registering at the following website: [email or web address]. By joining [employer]’s FollowMyHealth portal, you will also be able to view your health information using other applications (apps) compatible with our system.

## 2023-10-04 NOTE — DISCHARGE NOTE PROVIDER - CARE PROVIDER_API CALL
Ishaan Goetz  Cardiology  [address] [address] [place] [address], [address]  [place] [address]  Phone: [phone]  Fax: [phone]  Follow Up Time:     KEELY LAGOS  [address] [place] [address]  Phone: [phone]  Fax: ([phone]  Follow Up Time:

## 2023-10-04 NOTE — DISCHARGE NOTE PROVIDER - NSDCCPCAREPLAN_GEN_ALL_CORE_FT
PRINCIPAL DISCHARGE DIAGNOSIS  Diagnosis: Chest pain  Assessment and Plan of Treatment: -chest xray -clear lungs.  -echo noted normal biventricular systolic function.  -stress test noted: Medium-sized, severe defects in the mid to distal anterior and mid to distal anterolateral walls that are predominantly fixed suggestive of an infarction with minimal meghna-infarct ischemia.  - Left heart cath -Normal coronary arteries with Right Radial access  if you devlop chest pain again seek immediate medical attention   follow up with your PCP/cardiologist in 1-2 weeks call for appointment

## 2023-10-04 NOTE — PHARMACOTHERAPY INTERVENTION NOTE - COMMENTS
Medication list in Outpatient Medication Review (OMR) has been updated, verified with outpatient pharmacy.

## 2023-10-25 NOTE — PROVIDER CONTACT NOTE (OTHER) - DATE AND TIME:
16-Dec-2022 13:21 offloaded; All fall risk precautions in place;Call light within reach;Gait belt;Patient at risk for falls;Nurse notified; Left in chair;Chair alarm in place    EDUCATION  Education  Education Given To: Patient  Education Provided: Role of Therapy;Plan of Care;Safety;Precautions; Energy Conservation;Mobility Training; Fall Prevention Strategies;Transfer Training  Education Provided Comments: Educated on role of PT, safe progression of mobility, safe use of AD. Goals, schedule, care conferences and d/c planning on ARU.  Educated on use of TLSO and spinal px  Education Method: Demonstration;Verbal  Barriers to Learning: Cognition  Education Outcome: Verbalized understanding;Demonstrated understanding;Continued education needed        Therapy Time   Individual Concurrent Group Co-treatment   Time In 1400         Time Out 1500         Minutes 60           Timed Code Treatment Minutes: [facility] [place] PT, 10/25/23 at 2:47 PM

## 2023-11-10 ENCOUNTER — EMERGENCY (EMERGENCY)
Facility: HOSPITAL | Age: 53
LOS: 1 days | Discharge: HOME | End: 2023-11-10
Attending: STUDENT IN AN ORGANIZED HEALTH CARE EDUCATION/TRAINING PROGRAM | Admitting: STUDENT IN AN ORGANIZED HEALTH CARE EDUCATION/TRAINING PROGRAM
Payer: MEDICAID

## 2023-11-10 VITALS
SYSTOLIC BLOOD PRESSURE: 139 MMHG | HEART RATE: 72 BPM | OXYGEN SATURATION: 99 % | RESPIRATION RATE: 18 BRPM | DIASTOLIC BLOOD PRESSURE: 93 MMHG | TEMPERATURE: 98 F

## 2023-11-10 DIAGNOSIS — Z98.89 OTHER SPECIFIED POSTPROCEDURAL STATES: Chronic | ICD-10-CM

## 2023-11-10 PROCEDURE — 99283 EMERGENCY DEPT VISIT LOW MDM: CPT

## 2023-11-10 RX ORDER — HYDROCORTISONE 1 %
1 OINTMENT (GRAM) TOPICAL ONCE
Refills: 0 | Status: COMPLETED | OUTPATIENT
Start: 2023-11-10 | End: 2023-11-10

## 2023-11-10 RX ORDER — HYDROCORTISONE 1 %
1 OINTMENT (GRAM) TOPICAL ONCE
Refills: 0 | Status: DISCONTINUED | OUTPATIENT
Start: 2023-11-10 | End: 2023-11-10

## 2023-11-10 RX ADMIN — Medication 1 APPLICATION(S): at 21:17

## 2023-11-10 NOTE — ED PROVIDER NOTE - PHYSICAL EXAMINATION
Gen: WDWN, NAD, comfortable appearing, hemodynamically stable    HEENT: No conjunctival pallor, mucous membranes moist  CV: RRR, +S1/S2, no M/R/G, equal b/l radial pulses 2+  Resp: CTAB, no W/R/R, SPO2 >95% on RA, no increased WOB   GI: Abdomen soft non-distended, NTTP, no masses/organomegaly   Rectal: Chaperone Michi ED tech; small anal fissure at 9 o'clock with small amount of bleeding and small external hemorrhoid at 12 oclock, brown stool   MSK/Skin: No CVA tenderness, no bruising, no LE edema  Neuro: A&Ox4, moving all 4 extremities spontaneously  Psych: appropriate mood

## 2023-11-10 NOTE — ED PROVIDER NOTE - NSFOLLOWUPINSTRUCTIONS_ED_ALL_ED_FT
You were seen today in the ED for rectal bleeding and found to have an anal fissure (a cut on your anus) and external hemorrhoids. Hemorrhoids are swollen blood vessels inside your rectum (internal hemorrhoids) or on your anus (external hemorrhoids). Further instruction are attached and a brief summary can be found below.     Please return to the ED if:  -You have severe pain in your rectum or around your anus.  -You have severe pain in your abdomen and you are vomiting.  -You have bleeding from your anus that soaks through your underwear.  -Your hemorrhoid looks or feels more swollen than usual/feels very hard  -You see or feel tissue coming through your anus.    You can use over the counter pain medications (Tylenol or NSAIDs such as Ibuprofen) as needed and topical creams to relieve your pain.     Avoid heavy lifting and straining during bowel movements. You can try eating foods high in fiber (fruits, vegetables, and whole grains) and drink plenty of fluids.

## 2023-11-10 NOTE — ED PROVIDER NOTE - TEMPLATE, MLM
General Terbinafine Counseling: Patient counseling regarding adverse effects of terbinafine including but not limited to headache, diarrhea, rash, upset stomach, liver function test abnormalities, itching, taste/smell disturbance, nausea, abdominal pain, and flatulence.  There is a rare possibility of liver failure that can occur when taking terbinafine.  The patient understands that a baseline LFT and kidney function test may be required. The patient verbalized understanding of the proper use and possible adverse effects of terbinafine.  All of the patient's questions and concerns were addressed.

## 2023-11-10 NOTE — ED PROVIDER NOTE - PATIENT PORTAL LINK FT
You can access the FollowMyHealth Patient Portal offered by [facility] by registering at the following website: [email or web address]. By joining [employer]’s FollowMyHealth portal, you will also be able to view your health information using other applications (apps) compatible with our system.

## 2023-11-10 NOTE — ED PROVIDER NOTE - NSFOLLOWUPCLINICS_GEN_ALL_ED_FT
[facility]  Gastroenterology  [address] [place] [address] [address]  [place] [address]  Phone: [phone]  Fax:

## 2023-11-10 NOTE — ED ADULT TRIAGE NOTE - CHIEF COMPLAINT QUOTE
Circumcision baby    Date/Time: 2022 12:50 PM  Performed by: Margot Larsen, DO  Authorized by: Margot Larsen, DO     Written consent obtained?: Yes    Risks and benefits: Risks, benefits and alternatives were discussed    Consent given by:  Parent  Site marked: Yes    Required items: Required blood products, implants, devices and special equipment available    Patient identity confirmed:  Hospital-assigned identification number  Time out: Immediately prior to the procedure a time out was called    Anatomy: Normal    Vitamin K: Confirmed    Restraint:  Standard molded circumcision board  Pain management / analgesia:  0 8 mL 1% lidocaine intradermal 1 time  Prep Used:  Betadine  Clamps:      Gomco     1 1 cm  Instrument was checked pre-procedure and approximated appropriately    Complications: No    Estimated Blood Loss (mL):  0 1 Pt AOX4 c/o rectal pain and bleeding, notices blood when he wipes, bright red; takes ASA daily; has had this issue before, not sure if he has hemorrhoids; Hx of HTN but takes no BP meds,

## 2023-11-10 NOTE — ED PROVIDER NOTE - OBJECTIVE STATEMENT
53-year-old male with no past medical history presenting with rectal bleeding.  Patient describes small amount of bright red blood after wiping for past couple of days, associated pain with bowel movements and associated pain when wiping.  Denies any trauma to the area.  Also denies any dark stool or blood seen in stool. Denies any chest pain/shortness of breath, weakness, numbness/tingling, LOC, abdominal pain, fever/chills, nausea/vomiting/diarrhea, cough, rashes, or changes in urination.  Takes aspirin daily.  Denies AC use.

## 2023-11-10 NOTE — ED PROVIDER NOTE - CLINICAL SUMMARY MEDICAL DECISION MAKING FREE TEXT BOX
53-year-old male with no past medical history presenting with rectal bleeding after wiping, BRB, associated pain with BMs, small anal fissure at 9 o'clock with small amount of bleeding and small external hemorrhoid at 12 oclock, no symptoms of anemia, hemodynamically stable, no AC use, brown stool. Will treat with topical cream, education on sitz baths and exacerbating factors, and refer to GI for further f/u

## 2023-12-04 ENCOUNTER — EMERGENCY (EMERGENCY)
Facility: HOSPITAL | Age: 53
LOS: 1 days | Discharge: HOME | End: 2023-12-04
Admitting: STUDENT IN AN ORGANIZED HEALTH CARE EDUCATION/TRAINING PROGRAM
Payer: MEDICAID

## 2023-12-04 VITALS
RESPIRATION RATE: 20 BRPM | OXYGEN SATURATION: 97 % | TEMPERATURE: 98 F | DIASTOLIC BLOOD PRESSURE: 64 MMHG | SYSTOLIC BLOOD PRESSURE: 112 MMHG | HEART RATE: 78 BPM

## 2023-12-04 VITALS
OXYGEN SATURATION: 98 % | SYSTOLIC BLOOD PRESSURE: 111 MMHG | HEART RATE: 72 BPM | TEMPERATURE: 98 F | RESPIRATION RATE: 18 BRPM | DIASTOLIC BLOOD PRESSURE: 72 MMHG

## 2023-12-04 DIAGNOSIS — Z98.89 OTHER SPECIFIED POSTPROCEDURAL STATES: Chronic | ICD-10-CM

## 2023-12-04 PROCEDURE — 71046 X-RAY EXAM CHEST 2 VIEWS: CPT | Mod: 26

## 2023-12-04 PROCEDURE — 93010 ELECTROCARDIOGRAM REPORT: CPT

## 2023-12-04 PROCEDURE — 73564 X-RAY EXAM KNEE 4 OR MORE: CPT | Mod: 26,RT

## 2023-12-04 PROCEDURE — 99284 EMERGENCY DEPT VISIT MOD MDM: CPT

## 2023-12-04 RX ORDER — ALBUTEROL 90 UG/1
2 AEROSOL, METERED ORAL ONCE
Refills: 0 | Status: COMPLETED | OUTPATIENT
Start: 2023-12-04 | End: 2023-12-04

## 2023-12-04 RX ADMIN — ALBUTEROL 2 PUFF(S): 90 AEROSOL, METERED ORAL at 22:35

## 2023-12-04 RX ADMIN — Medication 60 MILLIGRAM(S): at 22:34
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
## 2023-12-04 NOTE — ED PROVIDER NOTE - PHYSICAL EXAMINATION
CONSTITUTIONAL: Comfortable; in no acute distress. Non-toxic appearing.   NEURO: Alert & oriented. Sensory and motor functions are grossly intact.  PSYCH: Mood appropriate. Thought processes intact.   HEAD: NCAT  CARD: Regular rate and rhythm, no murmurs  RESP: No accessory muscle use; breath sounds clear and equal bilaterally; no wheezes, rhonchi, or rales     MUSCULOSKELETAL/EXTREMITIES: FROM in all four extremities; no extremity edema.  SKIN: Warm; dry; no apparent lesions or exudate
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
## 2023-12-04 NOTE — ED ADULT NURSE NOTE - NSFALLUNIVINTERV_ED_ALL_ED
Bed/Stretcher in lowest position, wheels locked, appropriate side rails in place/Call bell, personal items and telephone in reach/Instruct patient to call for assistance before getting out of bed/chair/stretcher/Non-slip footwear applied when patient is off stretcher/[place] to call system/Physically safe environment - no spills, clutter or unnecessary equipment/Purposeful proactive rounding/Room/bathroom lighting operational, light cord in reach Bed/Stretcher in lowest position, wheels locked, appropriate side rails in place/Call bell, personal items and telephone in reach/Instruct patient to call for assistance before getting out of bed/chair/stretcher/Non-slip footwear applied when patient is off stretcher/[place] to call system/Physically safe environment - no spills, clutter or unnecessary equipment/Purposeful proactive rounding/Room/bathroom lighting operational, light cord in reach

## 2023-12-04 NOTE — ED PROVIDER NOTE - PATIENT PORTAL LINK FT
You can access the FollowMyHealth Patient Portal offered by [facility] by registering at the following website: [email or web address]. By joining [facility]’s FollowMyHealth portal, you will also be able to view your health information using other applications (apps) compatible with our system. You can access the FollowMyHealth Patient Portal offered by [facility] by registering at the following website: [email or web address]. By joining [employer]’s FollowMyHealth portal, you will also be able to view your health information using other applications (apps) compatible with our system.